# Patient Record
Sex: FEMALE | Race: WHITE | NOT HISPANIC OR LATINO | Employment: PART TIME | URBAN - METROPOLITAN AREA
[De-identification: names, ages, dates, MRNs, and addresses within clinical notes are randomized per-mention and may not be internally consistent; named-entity substitution may affect disease eponyms.]

---

## 2017-03-13 ENCOUNTER — ALLSCRIPTS OFFICE VISIT (OUTPATIENT)
Dept: OTHER | Facility: OTHER | Age: 57
End: 2017-03-13

## 2017-07-17 ENCOUNTER — ALLSCRIPTS OFFICE VISIT (OUTPATIENT)
Dept: OTHER | Facility: OTHER | Age: 57
End: 2017-07-17

## 2017-12-11 ENCOUNTER — ALLSCRIPTS OFFICE VISIT (OUTPATIENT)
Dept: OTHER | Facility: OTHER | Age: 57
End: 2017-12-11

## 2017-12-11 ENCOUNTER — TRANSCRIBE ORDERS (OUTPATIENT)
Dept: ADMINISTRATIVE | Facility: HOSPITAL | Age: 57
End: 2017-12-11

## 2017-12-11 DIAGNOSIS — G43.709 CHRONIC MIGRAINE WITHOUT AURA WITHOUT STATUS MIGRAINOSUS, NOT INTRACTABLE: Primary | ICD-10-CM

## 2017-12-11 DIAGNOSIS — G43.709 CHRONIC MIGRAINE WITHOUT AURA WITHOUT STATUS MIGRAINOSUS, NOT INTRACTABLE: ICD-10-CM

## 2017-12-11 DIAGNOSIS — E55.9 VITAMIN D DEFICIENCY: ICD-10-CM

## 2017-12-11 DIAGNOSIS — G47.00 INSOMNIA: ICD-10-CM

## 2017-12-12 NOTE — PROGRESS NOTES
Assessment    1  Chronic migraine without aura (346 70) (G43 709)   2  Low vitamin D level (268 9) (E55 9)    Plan  Chronic migraine without aura    · Cyproheptadine HCl - 4 MG Oral Tablet; take 1/2 tab po q hs x 5 days then 1 tabpo q hs thereafter   Rx By: Bj Frederick; Dispense: 0 Days ; #:30 Tablet; Refill: 0;For: Chronic migraine without aura; LEANNA = N; Verified Transmission to [facility]; Last Updated By: System, SureScripts; 12/11/2017 1:41:24 PM   · Topiramate 100 MG Oral Tablet; take 1 tablet twice a day   Rx By: Bj Frederick; Dispense: 30 Days ; #:60 Tablet; Refill: 3;For: Chronic migraine without aura; LEANNA = N; Verified Transmission to [facility]; Last Updated By: System, SureScripts; 12/11/2017 1:41:26 PM   · (1) HOMOCYSTEINE; Status:Active; Requested for:32Icq3263;    Perform:[facility]; Due:20Ilx5866; Ordered; For:Chronic migraine without aura; Ordered By:Anu Carrillo;   · (1) VITAMIN B12; Status:Active; Requested for:83Mrb9879;    Perform:[facility]; Due:50Opv9737; Ordered;migraine without aura; Ordered By:Anu Carrillo;   · * MRI BRAIN W WO CONTRAST; Status:Need Information - Financial Authorization; Requested for:66Xfh7666;    Perform:[facility]; Due:99Mcd2028; Ordered;migraine without aura; Ordered By:Anu Carrillo;   · * MRI CERVICAL SPINE WO CONTRAST; Status:Need Information - FinancialAuthorization; Requested for:64Top7890;    Perform:[facility]; Due:79Sfz6489; Ordered;migraine without aura; Ordered By:Anu Carrillo;   · Follow-up visit in 2 months Evaluation and Treatment  Follow-up  Status: Complete Done: 22KJE8810   Ordered; For: Chronic migraine without aura; Ordered By: Bj Frederick Performed:  Due: 25TFX9409; Last Updated By: Sarika White; 12/11/2017 2:08:52 PM  Chronic migraine without aura, Insomnia    · (1) TSH; Status:Active; Requested for:44Zzj0540;    Perform:[facility]; Due:50Daw7596; Ordered; For:Chronic migraine without aura, Insomnia; Ordered By:Anu Carrillo; Low vitamin D level    · (1) VITAMIN D 25-HYDROXY; Status:Active; Requested for:51Dkn6253;    Perform:[facility]; Due:55Bcl9968; Ordered;vitamin D level; Ordered By:Anu Carrillo;    Discussion/Summary  Discussion Summary:   Preventive:WIll increase her Topamax to 100 mg twice a day  she is to take cyproheptadine 4 mg half a tab for 5 days then one tab at bedtime for 2 months until seen to see if that helps decrease the morning headaches  she is to take Maxalt at onset and may repeat in two hours if needed  History of Present Illness  HPI: We had the pleasure of evaluating Jammie Floyd in neurological follow up today  As you know she is a 56year old right handed female  She is a  and is here today for evaluation of her headaches  She has history of hypertension, depression and headaches  Migraine headaches:medications do you take or have you taken for your headaches? Toprol, Zoloft, Prednisone (no headaches with steroids) she took it for a week, gabapentin, Topamax, botox (x 2 with no help), amitriptylineExcedrin (almost daily), ImitrexHeadache trigger: Fatigue, Stress/Tension, chocolate and turkeynone  often do the headaches occur - daily in the morning still but they are not as severe as they had been in the past time of the day do the headaches start â morning she wakes up with a 8-9/10 and as the day progress it does get better but at time it does not  She has daith earing in place  Two times a week it does not feel better  long do the headaches last â sometimes all day other time few hours a dayare they located â frontal, temporal and jaw painis the intensity of pain â 8-9/10 every few months, usually in the morning 6/10your usual headache - Throbbing, pressure, achy, shooting, stabbingsymptoms: photophobia, phonophobia, sensitivity to smell, nausea, vomiting, concentration problems, flushing of face, light-headed or dizzy, prefer to be alone and in a dark room, unable to work  ROS  Review of Systems  Neurological ROS:  Constitutional: no fever, no chills, no recent weight gain, no recent weight loss, no complaints of feeling tired, no changes in appetite  HEENT:  no sinus problems, not feeling congested, no blurred vision, no dryness of the eyes, no eye pain, no hearing loss, no tinnitus, no mouth sores, no sore throat, no hoarseness, no dysphagia, no masses, no bleeding  Cardiovascular:  no chest pain or pressure, no palpitations present, the heart rate was not rapid or irregular, no swelling in the arms or legs, no poor circulation  Respiratory:  no unusual or persistant cough, no shortness of breath with or without exertion  Gastrointestinal:  no nausea, no vomiting, no diarrhea, no abdominal pain, no changes in bowel habits, no melena, no loss of bowel control  Genitourinary:  no incontinence, no feelings of urinary urgency, no increase in frequency, no urinary hesitancy, no dysuria, no hematuria  Musculoskeletal:  no arthralgias, no myalgias, no immobility or loss of function, no head/neck/back pain, no pain while walking  Integumentary  no masses, no rash, no skin lesions, no livedo reticularis  Psychiatric:  no anxiety, no depression, no mood swings, no psychiatric hospitalizations, no sleep problems  Endocrine  no unusual weight loss or gain, no excessive urination, no excessive thirst, no hair loss or gain, no hot or cold intolerance, no menstrual period change or irregularity, no loss of sexual ability or drive, no erection difficulty, no nipple discharge  Hematologic/Lymphatic:  no unusual bleeding, no tendency for easy bruising, no clotting skin or lumps  Neurological General: headache, but-- no nausea or vomiting,-- no lightheadedness,-- no convulsions,-- no syncope-- and-- no trauma    Neurological Mental Status:  no confusion, no mood swings, no alteration or loss of consciousness, no difficulty expressing/understanding speech, no memory problems  Neurological Cranial Nerves:  no blurry or double vision, no loss of vision, no face drooping, no facial numbness or weakness, no taste or smell loss/changes, no hearing loss or ringing, no vertigo or dizziness, no dysphagia, no slurred speech  Neurological Motor findings include:  no tremor, no twitching, no cramping(pre/post exercise), no atrophy  Neurological Coordination:  no unsteadiness, no vertigo or dizziness, no clumsiness, no problems reaching for objects  ROS Reviewed:   ROS reviewed  Active Problems  1  Chronic migraine without aura (346 70) (G43 709)   2  Frequent episodic tension-type headache (339 11) (G44 219)   3  Insomnia (780 52) (G47 00)   4  Medication overuse headache (339 3) (G44 40)    Past Medical History  1  History of Anxiety (300 00) (F41 9)   2  History of depression (V11 8) (Z86 59)   3  History of hypertension (V12 59) (Z86 79)   4  History of migraine (V12 49) (Z86 69)    Surgical History  1  History of  Section    Family History  Mother    1  Family history of hypertension (V17 49) (Z82 49)  Family History    2  Family history of bipolar disorder (V17 0) (Z81 8)   3  Family history of hypertension (V17 49) (Z82 49)    Social History     · Completed 11th grade   · Current every day smoker (305 1) (F17 200)   · Four children   · Lives with significant other   · No alcohol use   · No drug use   · Single    Current Meds   1  ClonazePAM 0 5 MG Oral Tablet; TAKE 1 TABLET DAILY; Therapy: (Recorded:76Tzc0018) to Recorded   2  Magnesium Oxide 250 MG Oral Tablet; TAKE ONE IN AM DAILY; Therapy: 74NPZ7892 to (Last Rx:2016)  Requested for: 2016 Ordered   3  RisperDAL 1 MG Oral Tablet; take 0 5 tablet daily; Therapy: (Recorded:2016) to Recorded   4  Rizatriptan Benzoate 10 MG Oral Tablet Disintegrating; DISSOLVE 1 TABLET AT ONSET OF MIGRAINE MAY REPAT 2 HOURS LATER (MAX OF 2 IN 24 HOURS, 3 TABS/WEEK);  Therapy: 81ZIO2777 to (Evaluate:15Oct2017)  Requested for: 56RSE5546; Last Rx:99Kmi1938 Ordered   5  Topiramate 100 MG Oral Tablet; 1 5 mg qHS; Therapy: 35KOC4022 to (Evaluate:13Jan2018)  Requested for: 66EJR3092; Last Rx:42Tdm7166 Ordered   6  Topiramate 100 MG Oral Tablet; 1 5 tabs qHS (150 mg); [facility] 93ZVX4868 to ([facility]  Requested for: 08MMB5119; Last Rx:05Xth5961 Ordered   7  Toprol XL 50 MG Oral Tablet Extended Release 24 Hour; Take 1 tablet daily; Therapy: (Recorded:31Mar2015) to Recorded   8  Vitamin B-12 1000 MCG Oral Tablet; Take 1000mcg daily; Therapy: 97OKI4044 to (Last Rx:29Jun2016) Ordered   9  Vitamin B-2 100 MG Oral Tablet; Take 200mg daily; Therapy: 02DFU2962 to (Evaluate:27Oct2016); Last Rx:29Jun2016 Ordered   10  Zoloft 100 MG Oral Tablet; take 2 tablet daily; Therapy: (Recorded:31Mar2015) to Recorded    Allergies    1  No Known Drug Allergies    Physical Exam   Constitutional  General appearance: No acute distress, well appearing and well nourished  -- she has mild cervical dystonia with neck tilted to the right  Eyes  Ophthalmoscopic examination: Vision is grossly normal  Gross visual field testing by confrontation shows no abnormalities  EOMI in both eyes  Conjunctivae clear  Eyelids normal palpebral fissures equal  Orbits exhibit normal position  No discharge from the eyes  PERRL  Musculoskeletal  Gait and station: Normal gait, stance and balance  Muscle strength: Normal strength throughout  Muscle tone: No atrophy, abnormal movements, flaccidity, cogwheeling or spasticity     Neurologic  Orientation to person, place, and time: Normal    2nd cranial nerve: Normal    3rd, 4th, and 6th cranial nerves: Normal    5th cranial nerve: Normal    7th cranial nerve: Normal    8th cranial nerve: Normal    9th cranial nerve: Normal    11th cranial nerve: Normal    12th cranial nerve: Normal    Sensation: Normal    Reflexes: Abnormal   Deep tendon reflexes: 3+ right patella-- and-- 3+ left patella2+ right biceps,-- 2+ left biceps,-- 2+ right triceps,-- 2+ left triceps,-- 2+ right brachioradialis,-- 2+ left brachioradialis,-- 2+ right ankle jerk-- and-- 2+ left ankle jerk    Coordination: Normal    Mood and affect: Normal        Future Appointments    Date/Time Provider Specialty Site   02/13/2018 11:30 AM Margarette Devlin [facility] [address]       Signatures   Electronically signed by : Carol Santos MD; Dec 11 2017  2:17PM EST                       (Author)

## 2017-12-20 ENCOUNTER — APPOINTMENT (OUTPATIENT)
Dept: LAB | Facility: HOSPITAL | Age: 57
End: 2017-12-20
Attending: PSYCHIATRY & NEUROLOGY
Payer: COMMERCIAL

## 2017-12-20 DIAGNOSIS — E55.9 VITAMIN D DEFICIENCY: ICD-10-CM

## 2017-12-20 DIAGNOSIS — G43.709 CHRONIC MIGRAINE WITHOUT AURA WITHOUT STATUS MIGRAINOSUS, NOT INTRACTABLE: ICD-10-CM

## 2017-12-20 DIAGNOSIS — G47.00 INSOMNIA: ICD-10-CM

## 2017-12-20 LAB
25(OH)D3 SERPL-MCNC: 30.3 NG/ML (ref 30–100)
BUN SERPL-MCNC: 16 MG/DL (ref 5–25)
CREAT SERPL-MCNC: 0.82 MG/DL (ref 0.6–1.3)
GFR SERPL CREATININE-BSD FRML MDRD: 81 ML/MIN/1.73SQ M
HCYS SERPL-SCNC: 11 UMOL/L (ref 3.7–11.2)
TSH SERPL DL<=0.05 MIU/L-ACNC: 0.85 UIU/ML (ref 0.36–3.74)
VIT B12 SERPL-MCNC: 643 PG/ML (ref 100–900)

## 2017-12-20 PROCEDURE — 36415 COLL VENOUS BLD VENIPUNCTURE: CPT

## 2017-12-20 PROCEDURE — 82565 ASSAY OF CREATININE: CPT

## 2017-12-20 PROCEDURE — 83090 ASSAY OF HOMOCYSTEINE: CPT

## 2017-12-20 PROCEDURE — 82607 VITAMIN B-12: CPT

## 2017-12-20 PROCEDURE — 84520 ASSAY OF UREA NITROGEN: CPT

## 2017-12-20 PROCEDURE — 84443 ASSAY THYROID STIM HORMONE: CPT

## 2017-12-20 PROCEDURE — 82306 VITAMIN D 25 HYDROXY: CPT

## 2017-12-27 ENCOUNTER — HOSPITAL ENCOUNTER (OUTPATIENT)
Dept: MRI IMAGING | Facility: HOSPITAL | Age: 57
Discharge: HOME | End: 2017-12-30
Payer: COMMERCIAL

## 2017-12-27 DIAGNOSIS — G43.709 CHRONIC MIGRAINE WITHOUT AURA WITHOUT STATUS MIGRAINOSUS, NOT INTRACTABLE: ICD-10-CM

## 2017-12-27 PROCEDURE — 72141 MRI NECK SPINE W/O DYE: CPT

## 2017-12-27 PROCEDURE — A9585 GADOBUTROL INJECTION: HCPCS | Performed by: RADIOLOGY

## 2017-12-27 PROCEDURE — 70553 MRI BRAIN STEM W/O & W/DYE: CPT

## 2017-12-27 RX ADMIN — GADOBUTROL 6 ML: 604.72 INJECTION INTRAVENOUS at 15:44

## 2018-01-13 VITALS
OXYGEN SATURATION: 98 % | DIASTOLIC BLOOD PRESSURE: 70 MMHG | RESPIRATION RATE: 16 BRPM | SYSTOLIC BLOOD PRESSURE: 120 MMHG | HEART RATE: 69 BPM | WEIGHT: 134.44 LBS

## 2018-01-13 VITALS
WEIGHT: 132.5 LBS | OXYGEN SATURATION: 95 % | BODY MASS INDEX: 22.74 KG/M2 | SYSTOLIC BLOOD PRESSURE: 130 MMHG | HEART RATE: 67 BPM | DIASTOLIC BLOOD PRESSURE: 86 MMHG

## 2018-02-12 RX ORDER — RISPERIDONE 1 MG/1
0.5 TABLET, FILM COATED ORAL DAILY
COMMUNITY
End: 2018-08-30 | Stop reason: ALTCHOICE

## 2018-02-12 RX ORDER — CYPROHEPTADINE HYDROCHLORIDE 4 MG/1
4 TABLET ORAL DAILY
Refills: 0 | COMMUNITY
Start: 2018-01-22 | End: 2018-12-31 | Stop reason: SDUPTHER

## 2018-02-12 RX ORDER — RIZATRIPTAN BENZOATE 10 MG/1
1 TABLET, ORALLY DISINTEGRATING ORAL
COMMUNITY
Start: 2017-07-17 | End: 2018-08-16 | Stop reason: SDUPTHER

## 2018-02-12 RX ORDER — METOPROLOL SUCCINATE 50 MG/1
1 TABLET, EXTENDED RELEASE ORAL DAILY
COMMUNITY
End: 2018-08-30 | Stop reason: ALTCHOICE

## 2018-02-12 RX ORDER — CLONAZEPAM 0.5 MG/1
0.5 TABLET ORAL DAILY
Refills: 0 | COMMUNITY
Start: 2018-01-25

## 2018-02-12 RX ORDER — TOPIRAMATE 100 MG/1
200 TABLET, FILM COATED ORAL DAILY
COMMUNITY
Start: 2015-03-31 | End: 2018-08-30 | Stop reason: ALTCHOICE

## 2018-02-12 RX ORDER — AMITRIPTYLINE HYDROCHLORIDE 10 MG/1
TABLET, FILM COATED ORAL
COMMUNITY
Start: 2016-12-13 | End: 2018-02-13

## 2018-02-12 RX ORDER — MULTIVITAMIN/IRON/FOLIC ACID 18MG-0.4MG
1 TABLET ORAL DAILY
COMMUNITY
Start: 2016-03-28

## 2018-02-12 RX ORDER — SERTRALINE HYDROCHLORIDE 100 MG/1
2 TABLET, FILM COATED ORAL DAILY
COMMUNITY
End: 2020-04-21

## 2018-02-12 RX ORDER — LANOLIN ALCOHOL/MO/W.PET/CERES
1000 CREAM (GRAM) TOPICAL DAILY
COMMUNITY
Start: 2016-06-29

## 2018-02-12 NOTE — PROGRESS NOTES
Patient ID: Harish Ayala is a 56 y o  female  Assessment/Plan:    Intractable chronic migraine without aura and without status migrainosus  Continue Topamax 100 mg twice a day  Continue vitamin B2  Continue Zoloft  Continue magnesium  Continue metoprolol  Continue to use rizatriptan as needed  We need to decrease Excedrin use as daily use causes medication overuse headaches  Therefore, we will setup  headache infusion for 5 days    Cellulitis  Augmentin twice a day for 10 days  Please take pro biotic (ami-stor)  Follow-up with your primary care doctor for this         Problem List Items Addressed This Visit        Cardiovascular and Mediastinum    Intractable chronic migraine without aura and without status migrainosus - Primary     Continue Topamax 100 mg twice a day  Continue vitamin B2  Continue Zoloft  Continue magnesium  Continue metoprolol  Continue to use rizatriptan as needed  We need to decrease Excedrin use as daily use causes medication overuse headaches  Therefore, we will setup  headache infusion for 5 days         Relevant Medications    clonazePAM (KlonoPIN) 0 5 mg tablet    rizatriptan (MAXALT-MLT) 10 MG disintegrating tablet    topiramate (TOPAMAX) 100 mg tablet    metoprolol succinate (TOPROL XL) 50 mg 24 hr tablet    sertraline (ZOLOFT) 100 mg tablet       Other    Cellulitis     Augmentin twice a day for 10 days  Please take pro biotic (ami-stor)  Follow-up with your primary care doctor for this         Relevant Medications    amoxicillin-clavulanate (AUGMENTIN) 875-125 mg per tablet         Follow-up in 6 weeks  This should be after headache infusion  Subjective:    HPI    We had the pleasure of evaluating Harish Ayala in neurological follow up today  As you know she is a 56year old right handed female  She is a  and is here today for evaluation of her headaches  She has history of hypertension, depression and headaches   At last visit Topamax was increased and cyproheptadine was added  This provided no help  Patient still complains of headaches which start when she 1st wakes up at a 6/10 and go all the way to a 10/10 a few times a month  She has no relief  She takes her Excedrin migraine every day without fail  In addition, patient needed to remove her Daith earring as it was infected  Patient was not placed on any antibiotics  Migraine headaches:  What medications do you take or have you taken for your headaches? Preventative:Toprol, Zoloft, Prednisone (no headaches with steroids) she took it for a week, gabapentin, Topamax, botox (x 2 with no help), amitriptyline, cyproheptadine  Abortive: Excedrin migraine, imitrex, maxalt  Headache trigger: Fatigue, Stress/Tension, chocolate and turkey  How often do the headaches occur - daily in the morning still but they are not as severe as they had been in the past   What time of the day do the headaches star morning she wakes up with a 8-9/10 and as the day progress it does get better but at time it does not  She has daith earing in place  Two times a week it does not feel better  How long do the headaches last: sometimes all day other time few hours a day  Where are they located: frontal, temporal and jaw pain  What is the intensity of pain: 8-10/10 a few times a months, usually in the morning 6/10  Describe your usual headache - Throbbing, pressure, achy, shooting, stabbing  Associated symptoms: photophobia, phonophobia, sensitivity to smell, nausea, vomiting, concentration problems, flushing of face, light-headed or dizzy, prefer to be alone and in a dark room, unable to work    Vit D, B12, homocysteine, TSH normal 12/17 12/27/17 MRI C spine    IMPRESSION:     Mild spondylotic changes are similar to the previous study  12/27/17 MRI Brain  IMPRESSION:     No acute infarction, intracranial hemorrhage or mass           The following portions of the patient's history were reviewed and updated as appropriate: allergies, current medications, past family history, past medical history, past social history, past surgical history and problem list          Objective: There were no vitals taken for this visit  Physical Exam   Constitutional: She appears well-developed and well-nourished  HENT:   Head: Normocephalic and atraumatic  Left Ear: There is drainage, swelling and tenderness  Left external ear very edematous, erythematous and appears infected warm to the touch   Eyes: EOM are normal  Pupils are equal, round, and reactive to light  Neck: Normal range of motion  Cardiovascular: Normal rate  Pulmonary/Chest: Effort normal    Musculoskeletal: Normal range of motion  Neurological: She has normal strength and normal reflexes  Gait and coordination normal    Skin: Skin is warm and dry  Psychiatric: She has a normal mood and affect  Her speech is normal    Nursing note and vitals reviewed  Neurological Exam    Mental Status  The patient is alert and oriented to person, place, time, and situation  Her recent and remote memory are normal  She has no visuospatial neglect  Her speech is normal  Her language is fluent with no aphasia  She has normal attention span and concentration  She has a normal fund of knowledge  Cranial Nerves    CN II: The patient's visual acuity and visual fields are normal   CN III, IV, VI: The patient's pupils are equally round and reactive to light and ocular movements are normal   CN V: The patient has normal facial sensation  CN VII:  The patient has symmetric facial movement  CN VIII:  The patient's hearing is normal   CN IX, X: The patient has symmetric palate movement and normal gag reflex  CN XI: The patient's shoulder shrug strength is normal   CN XII: The patient's tongue is midline without atrophy or fasciculations  Motor  The patient has normal muscle bulk throughout  Her overall muscle tone is normal throughout   Her strength is 5/5 throughout all four extremities  Sensory  The patient's sensation is normal in all four extremities  She has normal cortical sensation    Reflexes  Deep tendon reflexes are 2+ and symmetric in all four extremities with downgoing toes bilaterally  Gait and Coordination  The patient has normal gait and station and normal casual, toe, heel, and tandem gait  She has normal coordination bilaterally  ROS:    Review of Systems   Constitutional: Positive for fatigue  HENT: Positive for ear discharge and ear pain  Eyes: Negative  Respiratory: Negative  Cardiovascular: Negative  Gastrointestinal: Negative  Endocrine: Negative  Genitourinary: Negative  Musculoskeletal: Negative  Skin: Negative  Allergic/Immunologic: Negative  Neurological: Positive for weakness, numbness and headaches  Hematological: Negative  Psychiatric/Behavioral: The patient is nervous/anxious

## 2018-02-13 ENCOUNTER — OFFICE VISIT (OUTPATIENT)
Dept: NEUROLOGY | Facility: CLINIC | Age: 58
End: 2018-02-13
Payer: COMMERCIAL

## 2018-02-13 DIAGNOSIS — G43.719 INTRACTABLE CHRONIC MIGRAINE WITHOUT AURA AND WITHOUT STATUS MIGRAINOSUS: Primary | ICD-10-CM

## 2018-02-13 DIAGNOSIS — L03.818 CELLULITIS OF OTHER SPECIFIED SITE: ICD-10-CM

## 2018-02-13 PROBLEM — L03.90 CELLULITIS: Status: ACTIVE | Noted: 2018-02-13

## 2018-02-13 PROCEDURE — 99214 OFFICE O/P EST MOD 30 MIN: CPT | Performed by: PHYSICIAN ASSISTANT

## 2018-02-13 RX ORDER — AMOXICILLIN AND CLAVULANATE POTASSIUM 875; 125 MG/1; MG/1
1 TABLET, FILM COATED ORAL EVERY 12 HOURS SCHEDULED
Qty: 20 TABLET | Refills: 0 | Status: SHIPPED | OUTPATIENT
Start: 2018-02-13 | End: 2018-02-23

## 2018-02-13 NOTE — PROGRESS NOTES
Patient ID: June Jammie is a 56 y o  female  Assessment/Plan:    No problem-specific Assessment & Plan notes found for this encounter  {Assess/PlanSmartLinks:54221}       Subjective:    HPI    {[facility] HPI texts:35986}    {Common ambulatory SmartLinks:72381}         Objective: There were no vitals taken for this visit  Physical Exam    Neurological Exam      ROS:    Review of Systems   Constitutional: Positive for fatigue  HENT: Negative  Eyes: Negative  Respiratory: Negative  Cardiovascular: Negative  Gastrointestinal: Negative  Endocrine: Negative  Genitourinary: Negative  Musculoskeletal: Negative  Skin: Negative  Allergic/Immunologic: Negative  Neurological: Positive for weakness, numbness and headaches  Hematological: Negative  Psychiatric/Behavioral: The patient is nervous/anxious

## 2018-02-13 NOTE — PATIENT INSTRUCTIONS
We will schedule headache infusion for you at the first opening, we will call when this is set up  Talk to your PCP about the thyroid nodule seen on your MRI from December      We will make medication adjustments after your headache infusion  We will plan to see you a week or so after the infusion

## 2018-02-13 NOTE — ASSESSMENT & PLAN NOTE
Continue Topamax 100 mg twice a day  Continue vitamin B2  Continue Zoloft  Continue magnesium  Continue metoprolol  Continue to use rizatriptan as needed  We need to decrease Excedrin use as daily use causes medication overuse headaches    Therefore, we will setup  headache infusion for 5 days

## 2018-02-13 NOTE — ASSESSMENT & PLAN NOTE
Augmentin twice a day for 10 days  Please take pro biotic (ami-stor)  Follow-up with your primary care doctor for this

## 2018-03-09 RX ORDER — SODIUM CHLORIDE 9 MG/ML
20 INJECTION, SOLUTION INTRAVENOUS CONTINUOUS
Status: DISCONTINUED | OUTPATIENT
Start: 2018-03-12 | End: 2018-03-15 | Stop reason: HOSPADM

## 2018-03-09 RX ORDER — DIPHENHYDRAMINE HCL 25 MG
50 TABLET ORAL ONCE
Status: COMPLETED | OUTPATIENT
Start: 2018-03-12 | End: 2018-03-12

## 2018-03-09 RX ORDER — METHOCARBAMOL 500 MG/1
1000 TABLET, FILM COATED ORAL ONCE
Status: COMPLETED | OUTPATIENT
Start: 2018-03-12 | End: 2018-03-12

## 2018-03-12 ENCOUNTER — HOSPITAL ENCOUNTER (OUTPATIENT)
Dept: INFUSION CENTER | Facility: CLINIC | Age: 58
Discharge: HOME | End: 2018-03-12
Payer: COMMERCIAL

## 2018-03-12 VITALS
TEMPERATURE: 98 F | RESPIRATION RATE: 18 BRPM | HEART RATE: 60 BPM | DIASTOLIC BLOOD PRESSURE: 97 MMHG | SYSTOLIC BLOOD PRESSURE: 150 MMHG

## 2018-03-12 PROCEDURE — 96366 THER/PROPH/DIAG IV INF ADDON: CPT

## 2018-03-12 PROCEDURE — 96375 TX/PRO/DX INJ NEW DRUG ADDON: CPT

## 2018-03-12 PROCEDURE — 96367 TX/PROPH/DG ADDL SEQ IV INF: CPT

## 2018-03-12 PROCEDURE — 96365 THER/PROPH/DIAG IV INF INIT: CPT

## 2018-03-12 RX ADMIN — DIHYDROERGOTAMINE MESYLATE: 1 INJECTION, SOLUTION INTRAMUSCULAR; INTRAVENOUS; SUBCUTANEOUS at 10:57

## 2018-03-12 RX ADMIN — METHOCARBAMOL 1000 MG: 500 TABLET ORAL at 08:58

## 2018-03-12 RX ADMIN — SODIUM CHLORIDE 20 ML/HR: 0.9 INJECTION, SOLUTION INTRAVENOUS at 09:10

## 2018-03-12 RX ADMIN — DIPHENHYDRAMINE HCL 50 MG: 25 TABLET ORAL at 08:57

## 2018-03-12 RX ADMIN — ONDANSETRON 4 MG: 2 INJECTION INTRAMUSCULAR; INTRAVENOUS at 10:38

## 2018-03-12 RX ADMIN — FAMOTIDINE 20 MG: 10 INJECTION, SOLUTION INTRAVENOUS at 09:38

## 2018-03-12 RX ADMIN — VALPROATE SODIUM 1000 MG: 100 INJECTION, SOLUTION INTRAVENOUS at 11:43

## 2018-03-12 RX ADMIN — MAGNESIUM SULFATE HEPTAHYDRATE: 500 INJECTION, SOLUTION INTRAMUSCULAR; INTRAVENOUS at 12:22

## 2018-03-12 RX ADMIN — SODIUM CHLORIDE 1000 MG: 9 INJECTION, SOLUTION INTRAVENOUS at 09:56

## 2018-03-12 NOTE — PROGRESS NOTES
Pt  Denies headache this morning, but states she woke with headache this AM   Pt  Denies new symptoms or concerns today  [facility] Infusion Questionaire completed and signed  Pt  Has a hx of HTN and used a lot of Excedrine Headache medication recently  Benadryl, Robaxin, Pepcid, Solumedrol, Zofran, DHE, Depacon and Magnesium Sulfate cocktail ordered for infusion today  Medication education provided both verbally and written

## 2018-03-12 NOTE — PLAN OF CARE
Problem: PAIN - ADULT  Goal: Verbalizes/displays adequate comfort level or baseline comfort level  Interventions:  - Encourage patient to monitor pain and request assistance  - Assess pain using appropriate pain scale  - Administer analgesics based on type and severity of pain and evaluate response  - Implement non-pharmacological measures as appropriate and evaluate response  - Consider cultural and social influences on pain and pain management  - Notify physician/advanced practitioner if interventions unsuccessful or patient reports new pain  Outcome: Progressing      Problem: INFECTION - ADULT  Goal: Absence or prevention of progression during hospitalization  INTERVENTIONS:  - Assess and monitor for signs and symptoms of infection  - Monitor lab/diagnostic results  - Monitor all insertion sites, i e  indwelling lines, tubes, and drains  - Monitor endotracheal (as able) and nasal secretions for changes in amount and color  - [place] appropriate cooling/warming therapies per order  - Administer medications as ordered  - Instruct and encourage patient and family to use good hand hygiene technique  - Identify and instruct in appropriate isolation precautions for identified infection/condition  Outcome: Progressing    Goal: Absence of fever/infection during neutropenic period  INTERVENTIONS:  - Monitor WBC  - Implement neutropenic guidelines  Outcome: Progressing      Problem: Knowledge Deficit  Goal: Patient/family/caregiver demonstrates understanding of disease process, treatment plan, medications, and discharge instructions  Complete learning assessment and assess knowledge base    Interventions:  - Provide teaching at level of understanding  - Provide teaching via preferred learning methods  Outcome: Progressing

## 2018-03-12 NOTE — PROGRESS NOTES
Pt  Tolerated Headache medication without adverse event  Future appointments reviewed  AVS reviewed and provided

## 2018-03-13 ENCOUNTER — HOSPITAL ENCOUNTER (OUTPATIENT)
Dept: INFUSION CENTER | Facility: CLINIC | Age: 58
Discharge: HOME | End: 2018-03-13
Payer: COMMERCIAL

## 2018-03-13 VITALS
HEART RATE: 75 BPM | DIASTOLIC BLOOD PRESSURE: 64 MMHG | RESPIRATION RATE: 16 BRPM | SYSTOLIC BLOOD PRESSURE: 110 MMHG | TEMPERATURE: 97.5 F

## 2018-03-13 PROCEDURE — 96375 TX/PRO/DX INJ NEW DRUG ADDON: CPT

## 2018-03-13 PROCEDURE — 96367 TX/PROPH/DG ADDL SEQ IV INF: CPT

## 2018-03-13 PROCEDURE — 96366 THER/PROPH/DIAG IV INF ADDON: CPT

## 2018-03-13 PROCEDURE — 96365 THER/PROPH/DIAG IV INF INIT: CPT

## 2018-03-13 RX ORDER — DIPHENHYDRAMINE HCL 25 MG
50 TABLET ORAL ONCE
Status: COMPLETED | OUTPATIENT
Start: 2018-03-13 | End: 2018-03-13

## 2018-03-13 RX ORDER — METHOCARBAMOL 500 MG/1
1000 TABLET, FILM COATED ORAL ONCE
Status: COMPLETED | OUTPATIENT
Start: 2018-03-13 | End: 2018-03-13

## 2018-03-13 RX ORDER — SODIUM CHLORIDE 9 MG/ML
20 INJECTION, SOLUTION INTRAVENOUS CONTINUOUS
Status: DISCONTINUED | OUTPATIENT
Start: 2018-03-13 | End: 2018-03-16 | Stop reason: HOSPADM

## 2018-03-13 RX ADMIN — METHOCARBAMOL 1000 MG: 500 TABLET ORAL at 08:39

## 2018-03-13 RX ADMIN — MAGNESIUM SULFATE HEPTAHYDRATE: 500 INJECTION, SOLUTION INTRAMUSCULAR; INTRAVENOUS at 12:18

## 2018-03-13 RX ADMIN — SODIUM CHLORIDE 1000 MG: 9 INJECTION, SOLUTION INTRAVENOUS at 09:17

## 2018-03-13 RX ADMIN — DIPHENHYDRAMINE HCL 50 MG: 25 TABLET ORAL at 08:39

## 2018-03-13 RX ADMIN — ONDANSETRON 4 MG: 2 INJECTION INTRAMUSCULAR; INTRAVENOUS at 10:05

## 2018-03-13 RX ADMIN — SODIUM CHLORIDE 20 ML/HR: 0.9 INJECTION, SOLUTION INTRAVENOUS at 08:36

## 2018-03-13 RX ADMIN — DIHYDROERGOTAMINE MESYLATE: 1 INJECTION, SOLUTION INTRAMUSCULAR; INTRAVENOUS; SUBCUTANEOUS at 10:47

## 2018-03-13 RX ADMIN — VALPROATE SODIUM 1000 MG: 100 INJECTION, SOLUTION INTRAVENOUS at 11:42

## 2018-03-13 RX ADMIN — FAMOTIDINE 20 MG: 10 INJECTION, SOLUTION INTRAVENOUS at 08:43

## 2018-03-13 NOTE — PROGRESS NOTES
Patient arrived on unit  Stated she did not sleep very well last evening  Headache at a "3" this am  Denies any nausea/vomiting/dizziness  Denies taking any pain medicine last night or this am  Headache infusion initiated

## 2018-03-13 NOTE — PLAN OF CARE
Problem: Potential for Falls  Goal: Patient will remain free of falls  INTERVENTIONS:  - Assess patient frequently for physical needs  -  Identify cognitive and physical deficits and behaviors that affect risk of falls    -  [place] fall precautions as indicated by assessment   - Educate patient/family on patient safety including physical limitations  - Instruct patient to call for assistance with activity based on assessment  - Modify environment to reduce risk of injury  - Consider OT/PT consult to assist with strengthening/mobility   Outcome: Progressing

## 2018-03-13 NOTE — PROGRESS NOTES
Patient tolerated headache infusion today  Patient denies any headache at completion of infusion today  Denies any nausea/vomiting/dizziness  Aware to return tomorrow for Day #3

## 2018-03-14 ENCOUNTER — HOSPITAL ENCOUNTER (OUTPATIENT)
Dept: INFUSION CENTER | Facility: CLINIC | Age: 58
Discharge: HOME | End: 2018-03-14
Payer: COMMERCIAL

## 2018-03-14 VITALS
HEART RATE: 76 BPM | DIASTOLIC BLOOD PRESSURE: 67 MMHG | TEMPERATURE: 97.8 F | SYSTOLIC BLOOD PRESSURE: 97 MMHG | RESPIRATION RATE: 18 BRPM

## 2018-03-14 DIAGNOSIS — F51.01 PRIMARY INSOMNIA: Primary | ICD-10-CM

## 2018-03-14 PROCEDURE — 96366 THER/PROPH/DIAG IV INF ADDON: CPT

## 2018-03-14 PROCEDURE — 96367 TX/PROPH/DG ADDL SEQ IV INF: CPT

## 2018-03-14 PROCEDURE — 96365 THER/PROPH/DIAG IV INF INIT: CPT

## 2018-03-14 RX ORDER — DIPHENHYDRAMINE HCL 25 MG
50 TABLET ORAL ONCE
Status: COMPLETED | OUTPATIENT
Start: 2018-03-15 | End: 2018-03-15

## 2018-03-14 RX ORDER — DIPHENHYDRAMINE HCL 25 MG
50 TABLET ORAL ONCE
Status: COMPLETED | OUTPATIENT
Start: 2018-03-14 | End: 2018-03-14

## 2018-03-14 RX ORDER — METHOCARBAMOL 500 MG/1
1000 TABLET, FILM COATED ORAL ONCE
Status: COMPLETED | OUTPATIENT
Start: 2018-03-14 | End: 2018-03-14

## 2018-03-14 RX ORDER — TRAZODONE HYDROCHLORIDE 50 MG/1
50 TABLET ORAL
Qty: 5 TABLET | Refills: 0 | OUTPATIENT
Start: 2018-03-14 | End: 2018-08-30 | Stop reason: ALTCHOICE

## 2018-03-14 RX ORDER — METHOCARBAMOL 500 MG/1
1000 TABLET, FILM COATED ORAL ONCE
Status: COMPLETED | OUTPATIENT
Start: 2018-03-15 | End: 2018-03-15

## 2018-03-14 RX ORDER — SODIUM CHLORIDE 9 MG/ML
20 INJECTION, SOLUTION INTRAVENOUS CONTINUOUS
Status: DISCONTINUED | OUTPATIENT
Start: 2018-03-15 | End: 2018-03-18 | Stop reason: HOSPADM

## 2018-03-14 RX ORDER — SODIUM CHLORIDE 9 MG/ML
20 INJECTION, SOLUTION INTRAVENOUS CONTINUOUS
Status: DISCONTINUED | OUTPATIENT
Start: 2018-03-14 | End: 2018-03-17 | Stop reason: HOSPADM

## 2018-03-14 RX ADMIN — MAGNESIUM SULFATE HEPTAHYDRATE: 500 INJECTION, SOLUTION INTRAMUSCULAR; INTRAVENOUS at 11:30

## 2018-03-14 RX ADMIN — METHOCARBAMOL 1000 MG: 500 TABLET ORAL at 08:17

## 2018-03-14 RX ADMIN — DIPHENHYDRAMINE HCL 50 MG: 25 TABLET ORAL at 08:17

## 2018-03-14 RX ADMIN — SODIUM CHLORIDE 500 MG: 9 INJECTION, SOLUTION INTRAVENOUS at 09:25

## 2018-03-14 RX ADMIN — FAMOTIDINE 20 MG: 10 INJECTION, SOLUTION INTRAVENOUS at 08:29

## 2018-03-14 RX ADMIN — SODIUM CHLORIDE 20 ML/HR: 0.9 INJECTION, SOLUTION INTRAVENOUS at 08:29

## 2018-03-14 RX ADMIN — VALPROATE SODIUM 1000 MG: 100 INJECTION, SOLUTION INTRAVENOUS at 10:59

## 2018-03-14 RX ADMIN — DIHYDROERGOTAMINE MESYLATE: 1 INJECTION, SOLUTION INTRAMUSCULAR; INTRAVENOUS; SUBCUTANEOUS at 10:09

## 2018-03-14 RX ADMIN — ONDANSETRON 4 MG: 2 INJECTION INTRAMUSCULAR; INTRAVENOUS at 08:49

## 2018-03-14 NOTE — PLAN OF CARE
Problem: Potential for Falls  Goal: Patient will remain free of falls  INTERVENTIONS:  - Assess patient frequently for physical needs  -  Identify cognitive and physical deficits and behaviors that affect risk of falls    -  [place] fall precautions as indicated by assessment   - Educate patient/family on patient safety including physical limitations  - Instruct patient to call for assistance with activity based on assessment  - Modify environment to reduce risk of injury  - Consider OT/PT consult to assist with strengthening/mobility   Outcome: Progressing

## 2018-03-14 NOTE — PROGRESS NOTES
Pt arrived to unit in stable condition but does complain of insomnia  Pt states she did not sleep during headache infusion at all yesterday and she did not sleep at all overnight  Headache pain is rated at 3-4  Pt admits to mild nausea as well  It is noted that pt's Solumedrol dose has been decreased by 50% today  Dr Lexy Isaac notified of insomnia and she states she will call in prescription for sleeping med for pt tonight  Pt informed of same and verbalized understanding  Zofran will be administered for nausea as ordered

## 2018-03-14 NOTE — PROGRESS NOTES
Pt tolerated headache infusion well  Pt's nausea was relieved by Zofran, pt denied headache upon discharge, pt was able to sleep for brief periods today, she will  prescription for sleep aid on her way home today  Pt left unit in stable condition without question or concern

## 2018-03-15 ENCOUNTER — HOSPITAL ENCOUNTER (OUTPATIENT)
Dept: INFUSION CENTER | Facility: CLINIC | Age: 58
Discharge: HOME | End: 2018-03-15
Payer: COMMERCIAL

## 2018-03-15 VITALS
HEART RATE: 70 BPM | TEMPERATURE: 97.7 F | RESPIRATION RATE: 18 BRPM | DIASTOLIC BLOOD PRESSURE: 78 MMHG | SYSTOLIC BLOOD PRESSURE: 137 MMHG

## 2018-03-15 PROCEDURE — 96366 THER/PROPH/DIAG IV INF ADDON: CPT

## 2018-03-15 PROCEDURE — 96367 TX/PROPH/DG ADDL SEQ IV INF: CPT

## 2018-03-15 PROCEDURE — 96365 THER/PROPH/DIAG IV INF INIT: CPT

## 2018-03-15 RX ADMIN — MAGNESIUM SULFATE HEPTAHYDRATE: 500 INJECTION, SOLUTION INTRAMUSCULAR; INTRAVENOUS at 11:52

## 2018-03-15 RX ADMIN — VALPROATE SODIUM 500 MG: 100 INJECTION, SOLUTION INTRAVENOUS at 11:27

## 2018-03-15 RX ADMIN — ONDANSETRON 4 MG: 2 INJECTION INTRAMUSCULAR; INTRAVENOUS at 09:19

## 2018-03-15 RX ADMIN — SODIUM CHLORIDE 250 MG: 9 INJECTION, SOLUTION INTRAVENOUS at 09:41

## 2018-03-15 RX ADMIN — DIPHENHYDRAMINE HCL 50 MG: 25 TABLET ORAL at 08:55

## 2018-03-15 RX ADMIN — SODIUM CHLORIDE 20 ML/HR: 0.9 INJECTION, SOLUTION INTRAVENOUS at 08:55

## 2018-03-15 RX ADMIN — FAMOTIDINE 20 MG: 10 INJECTION, SOLUTION INTRAVENOUS at 08:57

## 2018-03-15 RX ADMIN — METHOCARBAMOL 1000 MG: 500 TABLET ORAL at 08:55

## 2018-03-15 RX ADMIN — DIHYDROERGOTAMINE MESYLATE: 1 INJECTION, SOLUTION INTRAMUSCULAR; INTRAVENOUS; SUBCUTANEOUS at 10:21

## 2018-03-15 NOTE — PROGRESS NOTES
Patient tolerated her headache infusion well without adverse reaction  She left ambulatory rating her headache at 0/10

## 2018-03-15 NOTE — PLAN OF CARE
Problem: Potential for Falls  Goal: Patient will remain free of falls  INTERVENTIONS:  - Assess patient frequently for physical needs  -  Identify cognitive and physical deficits and behaviors that affect risk of falls    -  [place] fall precautions as indicated by assessment   - Educate patient/family on patient safety including physical limitations  - Instruct patient to call for assistance with activity based on assessment  - Modify environment to reduce risk of injury  - Consider OT/PT consult to assist with strengthening/mobility   Outcome: Progressing

## 2018-03-16 ENCOUNTER — HOSPITAL ENCOUNTER (OUTPATIENT)
Dept: INFUSION CENTER | Facility: CLINIC | Age: 58
Discharge: HOME | End: 2018-03-16
Payer: COMMERCIAL

## 2018-03-16 VITALS
DIASTOLIC BLOOD PRESSURE: 88 MMHG | SYSTOLIC BLOOD PRESSURE: 125 MMHG | RESPIRATION RATE: 18 BRPM | HEART RATE: 68 BPM | TEMPERATURE: 97.6 F

## 2018-03-16 PROCEDURE — 96375 TX/PRO/DX INJ NEW DRUG ADDON: CPT

## 2018-03-16 PROCEDURE — 96366 THER/PROPH/DIAG IV INF ADDON: CPT

## 2018-03-16 PROCEDURE — 96367 TX/PROPH/DG ADDL SEQ IV INF: CPT

## 2018-03-16 PROCEDURE — 96365 THER/PROPH/DIAG IV INF INIT: CPT

## 2018-03-16 RX ORDER — DIPHENHYDRAMINE HCL 25 MG
50 TABLET ORAL ONCE
Status: COMPLETED | OUTPATIENT
Start: 2018-03-16 | End: 2018-03-16

## 2018-03-16 RX ORDER — METHOCARBAMOL 500 MG/1
1000 TABLET, FILM COATED ORAL ONCE
Status: COMPLETED | OUTPATIENT
Start: 2018-03-16 | End: 2018-03-16

## 2018-03-16 RX ORDER — SODIUM CHLORIDE 9 MG/ML
20 INJECTION, SOLUTION INTRAVENOUS CONTINUOUS
Status: DISCONTINUED | OUTPATIENT
Start: 2018-03-16 | End: 2018-03-19 | Stop reason: HOSPADM

## 2018-03-16 RX ADMIN — ONDANSETRON 4 MG: 2 INJECTION INTRAMUSCULAR; INTRAVENOUS at 10:16

## 2018-03-16 RX ADMIN — METHOCARBAMOL 1000 MG: 500 TABLET ORAL at 08:39

## 2018-03-16 RX ADMIN — DIHYDROERGOTAMINE MESYLATE: 1 INJECTION, SOLUTION INTRAMUSCULAR; INTRAVENOUS; SUBCUTANEOUS at 11:01

## 2018-03-16 RX ADMIN — SODIUM CHLORIDE 20 ML/HR: 0.9 INJECTION, SOLUTION INTRAVENOUS at 08:39

## 2018-03-16 RX ADMIN — SODIUM CHLORIDE 250 MG: 9 INJECTION, SOLUTION INTRAVENOUS at 09:35

## 2018-03-16 RX ADMIN — VALPROATE SODIUM 500 MG: 100 INJECTION, SOLUTION INTRAVENOUS at 12:05

## 2018-03-16 RX ADMIN — DIPHENHYDRAMINE HCL 50 MG: 25 TABLET ORAL at 08:39

## 2018-03-16 RX ADMIN — FAMOTIDINE 20 MG: 10 INJECTION, SOLUTION INTRAVENOUS at 08:40

## 2018-03-16 RX ADMIN — MAGNESIUM SULFATE HEPTAHYDRATE: 500 INJECTION, SOLUTION INTRAMUSCULAR; INTRAVENOUS at 12:45

## 2018-03-16 NOTE — PLAN OF CARE
Problem: Potential for Falls  Goal: Patient will remain free of falls  INTERVENTIONS:  - Assess patient frequently for physical needs  -  Identify cognitive and physical deficits and behaviors that affect risk of falls    -  [place] fall precautions as indicated by assessment   - Educate patient/family on patient safety including physical limitations  - Instruct patient to call for assistance with activity based on assessment  - Modify environment to reduce risk of injury  - Consider OT/PT consult to assist with strengthening/mobility   Outcome: Progressing

## 2018-03-16 NOTE — PROGRESS NOTES
Patient arrived on unit for Day #5 of infusion  Patients stated her headache pain is at a "3" in the frontal part of head  Denies any nausea/vomiting/dizzimess   Headache infusion initiated

## 2018-03-16 NOTE — PROGRESS NOTES
Patient tolerated headache infusion  Denies any headache at present time  Denies any nausea/vomiting dizziness  Patient stated she just feels very tired  Patient has f/u with neurology  AVS given to patient  Family driving home

## 2018-03-30 NOTE — PROGRESS NOTES
Patient ID: Tr Burkett is a 66 y o  female  Assessment/Plan:    Intractable chronic migraine without aura and without status migrainosus  Apply for BOTOX, we will contact you when approved to set up injections  Wean Topamax 75 mg for 5 nights, 50 mg for 5 night and 25 mg for 5 nights  Start Lamictal 1 tab for 5 nights, 2 tabs for 5 nights, 3 tabs for 5 nights and 4 tabs at bedtime  Continue Magnesium and B2  Continue Vit B12  Continue Sertraline in am    Abortive: At onset of migraine, take Rizatriptan  May repeat in 2 hours if needed  In addition, take the Prochlorperazine with the first dose of Rizatriptan  Limit the Excedrin to less than 3 times a week           Problem List Items Addressed This Visit        Cardiovascular and Mediastinum    Intractable chronic migraine without aura and without status migrainosus - Primary     Apply for BOTOX, we will contact you when approved to set up injections  Wean Topamax 75 mg for 5 nights, 50 mg for 5 night and 25 mg for 5 nights  Start Lamictal 1 tab for 5 nights, 2 tabs for 5 nights, 3 tabs for 5 nights and 4 tabs at bedtime  Continue Magnesium and B2  Continue Vit B12  Continue Sertraline in am    Abortive: At onset of migraine, take Rizatriptan  May repeat in 2 hours if needed  In addition, take the Prochlorperazine with the first dose of Rizatriptan  Limit the Excedrin to less than 3 times a week           Relevant Medications    prochlorperazine (COMPAZINE) 10 mg tablet    topiramate (TOPAMAX) 25 mg tablet    lamoTRIgine (LaMICtal) 25 mg tablet    ketorolac (TORADOL) 60 mg/2 mL IM injection 60 mg (Start on 4/4/2018 11:15 AM)    Other Relevant Orders    Ambulatory referral to Sleep Medicine       Other    Insomnia    Relevant Orders    Ambulatory referral to Sleep Medicine         Toradol 60 mg IM given in office today for migraine  Follow up 3 months but can change follow up if her BOTOX is approved      Patient has tried and failed multiple classes of medications and has over 15 migraines days a month which last longer than 4 hours each time  Subjective:    HPI  We had the pleasure of evaluating Angelo Palomares in neurological follow up today  As you know she is a 66year old right handed female  She is a  and is here today for evaluation of her headaches  She has history of hypertension, depression and headaches  Patient still complains of headaches which start when she 1st wakes up and can go all the way to an 8-910 a few times a month  She has no relief  In addition, patient needed to remove her Daith earring as it was infected  Followed with ENT for cellulitis from [address] piercing  Completed headache infusions from 3/12/18-3/16/18  States that she has decreased the Excedrin use to not daily        Migraine headaches:  What medications do you take or have you taken for your headaches? Preventative:Toprol, Zoloft, Prednisone (no headaches with steroids) she took it for a week, gabapentin, Topamax, botox (x 2 with no help), amitriptyline, cyproheptadine  Abortive: Excedrin migraine, imitrex, maxalt  Headache trigger: Fatigue, Stress/Tension, chocolate and turkey  How often do the headaches occur - 3-4 a week, greater than 15 migraine days a month  What time of the day do the headaches: states morning she wakes up with a 8-9/10 and as the day progress it does get better but at time it does not     How long do the headaches last: sometimes all day other time few hours a day  Where are they located: frontal, temporal and jaw pain  What is the intensity of pain: 3-9/10, 9/10 once a week, Average 4-5/10  Describe your usual headache - Throbbing, pressure, achy, shooting, stabbing  Associated symptoms: photophobia, phonophobia, sensitivity to smell, nausea, vomiting, concentration problems, flushing of face, light-headed or dizzy, prefer to be alone and in a dark room, unable to work     Vit D, B12, homocysteine, TSH normal 12/17 12/27/17 MRI C spine  IMPRESSION:  Mild spondylotic changes are similar to the previous study      12/27/17 MRI Brain  IMPRESSION:  No acute infarction, intracranial hemorrhage or mass  Sleep is interrupted  Has some difficulty falling asleep and wakes up multiple times through the night         The following portions of the patient's history were reviewed and updated as appropriate: allergies, current medications, past family history, past medical history, past social history, past surgical history and problem list          Objective:    Blood pressure 118/78, pulse 64, height 5' 3" (1 6 m), weight 64 2 kg (141 lb 9 6 oz)  Physical Exam   Constitutional: She appears well-developed and well-nourished  HENT:   Head: Normocephalic and atraumatic  Eyes: EOM are normal  Pupils are equal, round, and reactive to light  Neck: Normal range of motion  Cardiovascular: Normal rate  Pulmonary/Chest: Effort normal    Musculoskeletal: Normal range of motion  Neurological: She has normal strength and normal reflexes  Gait and coordination normal    Skin: Skin is warm and dry  Psychiatric: She has a normal mood and affect  Her speech is normal    Nursing note and vitals reviewed  Neurological Exam    Mental Status  The patient is alert and oriented to person, place, time, and situation  Her recent and remote memory are normal  She has no visuospatial neglect  Her speech is normal  Her language is fluent with no aphasia  She has normal attention span and concentration  She has a normal fund of knowledge  Cranial Nerves    CN II: The patient's visual acuity and visual fields are normal   CN III, IV, VI: The patient's pupils are equally round and reactive to light and ocular movements are normal   CN V: The patient has normal facial sensation  CN VII:  The patient has symmetric facial movement  CN VIII:  The patient's hearing is normal   CN IX, X: The patient has symmetric palate movement and normal gag reflex    CN XI: The patient's shoulder shrug strength is normal   CN XII: The patient's tongue is midline without atrophy or fasciculations  Motor  The patient has normal muscle bulk throughout  Her overall muscle tone is normal throughout  Her strength is 5/5 throughout all four extremities  Sensory  The patient's sensation is normal in all four extremities  She has normal cortical sensation    Reflexes  Deep tendon reflexes are 2+ and symmetric in all four extremities with downgoing toes bilaterally  Gait and Coordination  The patient has normal gait and station and normal casual, toe, heel, and tandem gait  She has normal coordination bilaterally  ROS:    Review of Systems   Constitutional: Negative  HENT: Positive for sinus pressure  Eyes: Negative  Respiratory: Negative  Cardiovascular: Negative  Gastrointestinal: Negative  Endocrine: Negative  Genitourinary: Negative  Musculoskeletal: Negative  Skin: Negative  Allergic/Immunologic: Negative  Neurological: Positive for dizziness, light-headedness, numbness and headaches  Hematological: Bruises/bleeds easily  Psychiatric/Behavioral: Positive for agitation and sleep disturbance  The patient is nervous/anxious           Depression

## 2018-04-04 ENCOUNTER — OFFICE VISIT (OUTPATIENT)
Dept: NEUROLOGY | Facility: CLINIC | Age: 58
End: 2018-04-04
Payer: COMMERCIAL

## 2018-04-04 VITALS
SYSTOLIC BLOOD PRESSURE: 118 MMHG | HEIGHT: 63 IN | WEIGHT: 141.6 LBS | HEART RATE: 64 BPM | DIASTOLIC BLOOD PRESSURE: 78 MMHG | BODY MASS INDEX: 25.09 KG/M2

## 2018-04-04 DIAGNOSIS — G43.719 INTRACTABLE CHRONIC MIGRAINE WITHOUT AURA AND WITHOUT STATUS MIGRAINOSUS: Primary | ICD-10-CM

## 2018-04-04 DIAGNOSIS — G47.00 INSOMNIA, UNSPECIFIED TYPE: ICD-10-CM

## 2018-04-04 PROCEDURE — 99214 OFFICE O/P EST MOD 30 MIN: CPT | Performed by: PHYSICIAN ASSISTANT

## 2018-04-04 PROCEDURE — 96372 THER/PROPH/DIAG INJ SC/IM: CPT | Performed by: PHYSICIAN ASSISTANT

## 2018-04-04 RX ORDER — OMEPRAZOLE 40 MG/1
40 CAPSULE, DELAYED RELEASE ORAL DAILY
Refills: 0 | COMMUNITY
Start: 2018-03-20

## 2018-04-04 RX ORDER — KETOROLAC TROMETHAMINE 30 MG/ML
60 INJECTION, SOLUTION INTRAMUSCULAR; INTRAVENOUS ONCE
Status: COMPLETED | OUTPATIENT
Start: 2018-04-04 | End: 2018-04-04

## 2018-04-04 RX ORDER — LAMOTRIGINE 25 MG/1
TABLET ORAL
Qty: 120 TABLET | Refills: 0 | Status: SHIPPED | OUTPATIENT
Start: 2018-04-04 | End: 2018-06-22 | Stop reason: SDUPTHER

## 2018-04-04 RX ORDER — PROCHLORPERAZINE MALEATE 10 MG
10 TABLET ORAL EVERY 6 HOURS PRN
Qty: 30 TABLET | Refills: 0 | Status: SHIPPED | OUTPATIENT
Start: 2018-04-04 | End: 2019-08-20 | Stop reason: ALTCHOICE

## 2018-04-04 RX ORDER — TOPIRAMATE 25 MG/1
TABLET ORAL
Qty: 40 TABLET | Refills: 0 | Status: SHIPPED | OUTPATIENT
Start: 2018-04-04 | End: 2018-08-30 | Stop reason: ALTCHOICE

## 2018-04-04 RX ADMIN — KETOROLAC TROMETHAMINE 60 MG: 30 INJECTION, SOLUTION INTRAMUSCULAR; INTRAVENOUS at 11:17

## 2018-04-04 NOTE — ASSESSMENT & PLAN NOTE
Apply for BOTOX, we will contact you when approved to set up injections  Wean Topamax 75 mg for 5 nights, 50 mg for 5 night and 25 mg for 5 nights  Start Lamictal 1 tab for 5 nights, 2 tabs for 5 nights, 3 tabs for 5 nights and 4 tabs at bedtime  Continue Magnesium and B2  Continue Vit B12  Continue Sertraline in am    Abortive: At onset of migraine, take Rizatriptan    May repeat in 2 hours if needed  In addition, take the Prochlorperazine with the first dose of Rizatriptan  Limit the Excedrin to less than 3 times a week

## 2018-04-04 NOTE — PATIENT INSTRUCTIONS
Preventative:  Apply for BOTOX, we will contact you when approved to set up injections  Wean Topamax 75 mg for 5 nights, 50 mg for 5 night and 25 mg for 5 nights  Start Lamictal 1 tab for 5 nights, 2 tabs for 5 nights, 3 tabs for 5 nights and 4 tabs at bedtime  Continue Magnesium and B2  Continue Vit B12  Continue Sertraline in am    Abortive: At onset of migraine, take Rizatriptan  May repeat in 2 hours if needed  In addition, take the Prochlorperazine with the first dose of Rizatriptan  Limit the Excedrin to less than 3 times a week    May take these over-the-counter supplements to decrease intensity and frequency of migraines  - Magnesium Oxide 400-500 mg a day  If any diarrhea or upset stomach, decrease dose  as tolerated  -  B2 200 mg a day  May take once a day in am  This supplement will change the color of the urine to fluorescent yellow no matter how hydrated, which is normal      Discussed side effects of all medications prescribed today to the patient in detail  Patient education was completed today and we also discussed precautions for rebound headaches  When patient has a moderate to severe headache, they should seek rest, initiate relaxation and apply cold compresses to the head  1  Maintain regular sleep schedule  Adults need at least 7-8 hours of uninterrupted a night  2  Limit over the counter medications such as Tylenol, Ibuprofen, Aleve, Excedrin  (No more than 3 times a week)  3  Maintain headache diary  We discussed an FORREST for a smart phone is "Migraine eDiary"  4  Limit caffeine to 1-2 cups a day or less  5  Avoid dietary trigger  (list given to the patient and reviewed with them)  6  Patient is to have regular frequent meals to prevent headache onset  7   Please drink at least 64 ounces of water a day to help remain hydrated  We will refer to sleep medicine for further work up on your difficulty sleeping

## 2018-04-23 ENCOUNTER — TELEPHONE (OUTPATIENT)
Dept: NEUROLOGY | Facility: CLINIC | Age: 58
End: 2018-04-23

## 2018-04-23 DIAGNOSIS — G43.709 CHRONIC MIGRAINE WITHOUT AURA WITHOUT STATUS MIGRAINOSUS, NOT INTRACTABLE: Primary | ICD-10-CM

## 2018-04-24 ENCOUNTER — TELEPHONE (OUTPATIENT)
Dept: NEUROLOGY | Facility: CLINIC | Age: 58
End: 2018-04-24

## 2018-04-24 NOTE — TELEPHONE ENCOUNTER
Per Jourdan, pts Botox order is still under the insur  Verif  Stage but is marked as a STAT order  I will check on this order status tomm

## 2018-04-25 NOTE — TELEPHONE ENCOUNTER
Per Jourdan, pts 200 units of Botox is set up to arrive in [facility]  This Fri  The 27th  Please await delivery and please let me know if Toxin arrives or not  Thanks!!!   Maco

## 2018-04-27 ENCOUNTER — DOCUMENTATION (OUTPATIENT)
Dept: NEUROLOGY | Facility: CLINIC | Age: 58
End: 2018-04-27

## 2018-05-02 ENCOUNTER — PROCEDURE VISIT (OUTPATIENT)
Dept: NEUROLOGY | Facility: CLINIC | Age: 58
End: 2018-05-02
Payer: COMMERCIAL

## 2018-05-02 VITALS — SYSTOLIC BLOOD PRESSURE: 131 MMHG | DIASTOLIC BLOOD PRESSURE: 69 MMHG | HEART RATE: 60 BPM | TEMPERATURE: 98 F

## 2018-05-02 DIAGNOSIS — G43.709 CHRONIC MIGRAINE WITHOUT AURA WITHOUT STATUS MIGRAINOSUS, NOT INTRACTABLE: Primary | ICD-10-CM

## 2018-05-02 DIAGNOSIS — G43.719 INTRACTABLE CHRONIC MIGRAINE WITHOUT AURA AND WITHOUT STATUS MIGRAINOSUS: ICD-10-CM

## 2018-05-02 PROCEDURE — 64615 CHEMODENERV MUSC MIGRAINE: CPT | Performed by: PSYCHIATRY & NEUROLOGY

## 2018-05-02 RX ORDER — DEXAMETHASONE 2 MG/1
TABLET ORAL
Qty: 5 TABLET | Refills: 0 | Status: SHIPPED | OUTPATIENT
Start: 2018-05-02 | End: 2018-08-30 | Stop reason: SDUPTHER

## 2018-05-02 NOTE — PROGRESS NOTES
Chemodenervation  Date/Time: 5/2/2018 1:14 PM  Performed by: Alma Bennett by: America Early details:     Prepped With: Alcohol    Anesthesia (see MAR for exact dosages): Anesthesia method:  None  Procedure details:     Position:  Upright  Botox:     Botox Type:  Type A    Brand:  Botox    mL's of Botulinum Toxin:  200    Final Concentration per CC:  200 units    Needle Gauge:  30 G 2 5 inch  Procedures:     Botox Procedures: chronic headache      Indications: migraines    Injection Location:     Head / Face:  L superior cervical paraspinal, R superior cervical paraspinal, L , R , L frontalis, R frontalis, L medial occipitalis, R medial occipitalis, procerus, R temporalis, L temporalis, R superior trapezius and L superior trapezius    L  injection amount:  5 unit(s)    R  injection amount:  5 unit(s)    L lateral frontalis:  5 unit(s)    R lateral frontalis:  5 unit(s)    L medial frontalis:  5 unit(s)    R medial frontalis:  5 unit(s)    L temporalis injection amount:  20 unit(s)    R temporalis injection amount:  20 unit(s)    Procerus injection amount:  5 unit(s)    L medial occipitalis injection amount:  15 unit(s)    R medial occipitalis injection amount:  15 unit(s)    L superior cervical paraspinal injection amount:  10 unit(s)    R superior cervical paraspinal injection amount:  10 unit(s)    L superior trapezius injection amount:  15 unit(s)    R superior trapezius injection amount:  15 unit(s)  Total Units:     Total units used:  200    Total units discarded:  0  Post-procedure details:     Chemodenervation:  Chronic migraine    Patient tolerance of procedure: Tolerated well, no immediate complications  Comments:      35 units in the temporal region bilaterally  Which was medically necessary

## 2018-06-04 DIAGNOSIS — G43.719 INTRACTABLE CHRONIC MIGRAINE WITHOUT AURA AND WITHOUT STATUS MIGRAINOSUS: ICD-10-CM

## 2018-06-04 NOTE — TELEPHONE ENCOUNTER
Pt requesting lamictal refill, I ordered 100mg tabs, 1 tab Qhs for ease of admin; please sign off if agreeable

## 2018-06-05 RX ORDER — LAMOTRIGINE 100 MG/1
TABLET ORAL
Qty: 30 TABLET | Refills: 3 | Status: SHIPPED | OUTPATIENT
Start: 2018-06-05 | End: 2018-08-30 | Stop reason: ALTCHOICE

## 2018-06-22 DIAGNOSIS — G43.719 INTRACTABLE CHRONIC MIGRAINE WITHOUT AURA AND WITHOUT STATUS MIGRAINOSUS: ICD-10-CM

## 2018-06-22 RX ORDER — LAMOTRIGINE 25 MG/1
TABLET ORAL
Qty: 120 TABLET | Refills: 0 | Status: SHIPPED | OUTPATIENT
Start: 2018-06-22 | End: 2018-07-31 | Stop reason: SDUPTHER

## 2018-07-10 ENCOUNTER — TELEPHONE (OUTPATIENT)
Dept: NEUROLOGY | Facility: CLINIC | Age: 58
End: 2018-07-10

## 2018-07-10 NOTE — TELEPHONE ENCOUNTER
Spoke with rep Britney Calle and set up delivery for Botox 200 unit single dose vial quantity of 1 to [place] office on Tuesday 7/31/18  I will await shipment

## 2018-07-10 NOTE — TELEPHONE ENCOUNTER
Referral Notes   Number of Notes: 1   Type Date User Summary Attachment   General 07/02/2018  2:59 PM Sandi Foley care coordination -   Note    Botox authorization number #Case - [id number] - valid until 4/5/2019 - please use [facility]

## 2018-07-18 NOTE — TELEPHONE ENCOUNTER
Patient called back stating she will be on vacation and r/s appt on 8/16/18 at 3 pm  Patient agreeable to date/time/location

## 2018-07-31 DIAGNOSIS — G43.719 INTRACTABLE CHRONIC MIGRAINE WITHOUT AURA AND WITHOUT STATUS MIGRAINOSUS: ICD-10-CM

## 2018-07-31 RX ORDER — LAMOTRIGINE 25 MG/1
TABLET ORAL
Qty: 120 TABLET | Refills: 0 | Status: SHIPPED | OUTPATIENT
Start: 2018-07-31 | End: 2018-08-30 | Stop reason: ALTCHOICE

## 2018-08-15 ENCOUNTER — TELEPHONE (OUTPATIENT)
Dept: NEUROLOGY | Facility: CLINIC | Age: 58
End: 2018-08-15

## 2018-08-15 NOTE — TELEPHONE ENCOUNTER
Pt called asking if we receive her botox  Per Maco, botox received and "everything is good to go"  Pt made aware    Confirmed botox appt 8/16/18 @ 3 pm

## 2018-08-16 ENCOUNTER — PROCEDURE VISIT (OUTPATIENT)
Dept: NEUROLOGY | Facility: CLINIC | Age: 58
End: 2018-08-16
Payer: COMMERCIAL

## 2018-08-16 VITALS — TEMPERATURE: 98.7 F | HEART RATE: 83 BPM | SYSTOLIC BLOOD PRESSURE: 139 MMHG | DIASTOLIC BLOOD PRESSURE: 83 MMHG

## 2018-08-16 DIAGNOSIS — G43.709 CHRONIC MIGRAINE WITHOUT AURA WITHOUT STATUS MIGRAINOSUS, NOT INTRACTABLE: Primary | ICD-10-CM

## 2018-08-16 PROCEDURE — 64615 CHEMODENERV MUSC MIGRAINE: CPT | Performed by: PHYSICIAN ASSISTANT

## 2018-08-16 RX ORDER — DEXAMETHASONE 2 MG/1
2 TABLET ORAL
Qty: 5 TABLET | Refills: 0 | Status: SHIPPED | OUTPATIENT
Start: 2018-08-16 | End: 2018-08-30 | Stop reason: ALTCHOICE

## 2018-08-16 RX ORDER — RIZATRIPTAN BENZOATE 10 MG/1
10 TABLET, ORALLY DISINTEGRATING ORAL AS NEEDED
Qty: 9 TABLET | Refills: 0 | Status: SHIPPED | OUTPATIENT
Start: 2018-08-16 | End: 2018-12-31 | Stop reason: SDUPTHER

## 2018-08-16 NOTE — PROGRESS NOTES
Chemodenervation  Date/Time: 8/16/2018 3:28 PM  Performed by: Manuel Davis  Authorized by: Quita Tinajero details:     Prepped With: Alcohol    Anesthesia (see MAR for exact dosages): Anesthesia method:  None  Procedure details:     Position:  Upright  Botox:     Botox Type:  Type A    Brand:  Botox    mL's of Botulinum Toxin:  200    Final Concentration per CC:  200 units    Needle Gauge:  30 G 2 5 inch  Procedures:     Botox Procedures: chronic headache      Indications: migraines    Injection Location:     Head / Face:  L superior cervical paraspinal, R superior cervical paraspinal, L , R , L frontalis, R frontalis, L medial occipitalis, R medial occipitalis, procerus, R temporalis, L temporalis, R superior trapezius and L superior trapezius    L  injection amount:  5 unit(s)    R  injection amount:  5 unit(s)    L lateral frontalis:  5 unit(s)    R lateral frontalis:  5 unit(s)    L medial frontalis:  5 unit(s)    R medial frontalis:  5 unit(s)    L temporalis injection amount:  20 unit(s)    R temporalis injection amount:  20 unit(s)    Procerus injection amount:  5 unit(s)    L medial occipitalis injection amount:  15 unit(s)    R medial occipitalis injection amount:  15 unit(s)    L superior cervical paraspinal injection amount:  10 unit(s)    R superior cervical paraspinal injection amount:  10 unit(s)    L superior trapezius injection amount:  15 unit(s)    R superior trapezius injection amount:  15 unit(s)  Total Units:     Total units used:  200    Total units discarded:  0  Post-procedure details:     Chemodenervation:  Chronic migraine    Facial Nerve Location[de-identified]  Bilateral facial nerve    Patient tolerance of procedure:   Tolerated well, no immediate complications  Comments:      5 units orbicularis oculi bilaterally  35 units frontalis and temporalis bilaterally  All medically necessary

## 2018-08-30 ENCOUNTER — OFFICE VISIT (OUTPATIENT)
Dept: NEUROLOGY | Facility: CLINIC | Age: 58
End: 2018-08-30
Payer: COMMERCIAL

## 2018-08-30 VITALS
HEART RATE: 83 BPM | SYSTOLIC BLOOD PRESSURE: 140 MMHG | BODY MASS INDEX: 26.13 KG/M2 | DIASTOLIC BLOOD PRESSURE: 90 MMHG | WEIGHT: 147.5 LBS | HEIGHT: 63 IN

## 2018-08-30 DIAGNOSIS — G43.719 INTRACTABLE CHRONIC MIGRAINE WITHOUT AURA AND WITHOUT STATUS MIGRAINOSUS: Primary | ICD-10-CM

## 2018-08-30 PROCEDURE — 99214 OFFICE O/P EST MOD 30 MIN: CPT | Performed by: PSYCHIATRY & NEUROLOGY

## 2018-08-30 PROCEDURE — 96372 THER/PROPH/DIAG INJ SC/IM: CPT | Performed by: PSYCHIATRY & NEUROLOGY

## 2018-08-30 RX ORDER — DIVALPROEX SODIUM 250 MG/1
TABLET, DELAYED RELEASE ORAL
Qty: 60 TABLET | Refills: 3 | Status: SHIPPED | OUTPATIENT
Start: 2018-08-30 | End: 2018-12-31 | Stop reason: SDUPTHER

## 2018-08-30 RX ORDER — KETOROLAC TROMETHAMINE 30 MG/ML
60 INJECTION, SOLUTION INTRAMUSCULAR; INTRAVENOUS ONCE
Status: COMPLETED | OUTPATIENT
Start: 2018-08-30 | End: 2018-08-30

## 2018-08-30 RX ORDER — OLANZAPINE 5 MG/1
TABLET ORAL
Qty: 30 TABLET | Refills: 3 | Status: SHIPPED | OUTPATIENT
Start: 2018-08-30 | End: 2018-12-20 | Stop reason: SDUPTHER

## 2018-08-30 RX ORDER — DEXAMETHASONE 1 MG
TABLET ORAL
Qty: 10 TABLET | Refills: 0 | Status: SHIPPED | OUTPATIENT
Start: 2018-08-30 | End: 2018-08-30

## 2018-08-30 RX ADMIN — KETOROLAC TROMETHAMINE 60 MG: 30 INJECTION, SOLUTION INTRAMUSCULAR; INTRAVENOUS at 13:39

## 2018-08-30 NOTE — PROGRESS NOTES
Patient ID: Adelina Munoz is a 66 y o  female  Assessment/Plan:   Problem List Items Addressed This Visit        Cardiovascular and Mediastinum    Intractable chronic migraine without aura and without status migrainosus - Primary    Relevant Medications    ketorolac (TORADOL) 60 mg/2 mL IM injection 60 mg (Completed)    prochlorperazine edisylate (COMPAZINE) injection 10 mg    OLANZapine (ZyPREXA) 5 mg tablet    divalproex sodium (DEPAKOTE) 250 mg EC tablet          Preventive therapy for migraine headaches:   - Will wean patient off of her Lamictal by having her take 50 mg for 5 days then stop  - She is to start taking Depakote 5 days after she is off of her Lamictal     - Start taking olanzapine 5 mg at bedtime daily  Abortive therapy for migraine headaches:   - at the onset of a migraine headache she is to take Maxalt sublingual 10 mg with Compazine 10 mg  If in 2 hours her headache has not improved she has take Decadron 1 mg  If this does not improve her headache patient to call our office  We will see patient back in 2 months time  She is to complete 1 more Botox and if it still does not helps then she is stop getting it    Consider the new CGRP drug  Subjective:  HPI  We had the pleasure of evaluating Adelina Munoz in neurological follow up today  As you know she is a 66year old right handed female  She is a  and is here today for evaluation of her headaches  She has history of hypertension, depression and headaches  Daith piercing:    - In addition, patient needed to remove her Daith earring as it was infected   Followed with ENT for cellulitis from [address] piercing  Sleep is interrupted: Has some difficulty falling asleep and wakes up multiple times through the night  Chronic Migraine headaches with and without aura:    What medications do you take or have you taken for your headaches?    Preventative:  - Toprol,   - Zoloft, amitriptyline,  - Cyproheptadine, Prednisone (no headaches with steroids) she took it for a week,  botox (x 2 with no help),    - Gabapentin, Topamax  Abortive: Excedrin migraine, imitrex, maxalt    Headache trigger: Fatigue, Stress/Tension, chocolate and turkey      She came in today with the pain of 9/10 and was at 5/10 at the time of discharge  How often do the headaches occur - 3-4 a week, greater than 15 migraine days a month   Her current     headache started on      Tuesday and has been persistent  Patient states initially on up to 10/10  She has     stopped taking her daily medication  Has not taken any over-the-counter medication for the last one week  What time of the day do the headaches: states morning she wakes up with a 8-9/10 and as the day progress it does get better but at time it does not  How long do the headaches last: sometimes all day other time few hours a day  Where are they located: frontal, temporal and jaw pain  What is the intensity of pain: 3-9/10, 9/10 once a week, Average 4-5/10  Describe your usual headache - Throbbing, pressure, achy, shooting, stabbing  Associated symptoms: photophobia, phonophobia, sensitivity to smell, nausea, vomiting, concentration problems, flushing of face, light-headed or dizzy, prefer to be alone and in a dark room, unable to work     Work up completed:   Vit D, B12, homocysteine, TSH normal 12/17 12/27/17 MRI C spine  IMPRESSION:  Mild spondylotic changes are similar to the previous study          12/27/17 MRI Brain  IMPRESSION:  No acute infarction, intracranial hemorrhage or mass             The following portions of the patient's history were reviewed and updated as appropriate: allergies, current medications, past family history, past medical history, past social history, past surgical history and problem list          Objective:    Blood pressure 140/90, pulse 83, height 5' 3" (1 6 m), weight 66 9 kg (147 lb 8 oz)      Physical Exam   Constitutional: She appears well-developed  Eyes: EOM are normal  Pupils are equal, round, and reactive to light  Neck: Normal range of motion  Neck supple  Cardiovascular: Normal rate  Pulmonary/Chest: Effort normal    Abdominal: Soft  Musculoskeletal: Normal range of motion  Neurological: She has normal strength and normal reflexes  Gait and coordination normal    Skin: Skin is warm  Psychiatric: She has a normal mood and affect  Her speech is normal        Neurological Exam    Mental Status  The patient is alert  Her speech is normal  Her language is fluent with no aphasia  She has normal attention span and concentration  Cranial Nerves    CN II: The patient's visual acuity and visual fields are normal   CN III, IV, VI: The patient's pupils are equally round and reactive to light and ocular movements are normal   CN V: The patient has normal facial sensation  CN VII:  The patient has symmetric facial movement  CN VIII:  The patient's hearing is normal   CN IX, X: The patient has symmetric palate movement and normal gag reflex  CN XI: The patient's shoulder shrug strength is normal   CN XII: The patient's tongue is midline without atrophy or fasciculations  Motor   Her strength is 5/5 throughout all four extremities  Sensory  The patient's sensation is normal in all four extremities  Reflexes  Deep tendon reflexes are 2+ and symmetric in all four extremities with downgoing toes bilaterally  Gait and Coordination  The patient has normal gait and station and normal casual, toe, heel, and tandem gait  She has normal coordination bilaterally  ROS:    Review of Systems   Constitutional: Negative  Negative for appetite change and fever  HENT: Positive for ear pain  Negative for hearing loss, tinnitus, trouble swallowing and voice change  Eyes: Negative  Negative for photophobia and pain  Respiratory: Negative  Negative for shortness of breath  Cardiovascular: Negative    Negative for palpitations  Gastrointestinal: Positive for abdominal pain, nausea and vomiting  Endocrine: Negative  Negative for cold intolerance and heat intolerance  Genitourinary: Negative  Negative for dysuria, frequency and urgency  Musculoskeletal: Positive for neck pain  Negative for myalgias  Right arm pain   Skin: Negative  Negative for rash  Neurological: Positive for headaches  Negative for dizziness, tremors, seizures, syncope, facial asymmetry, speech difficulty, weakness, light-headedness and numbness  Hematological: Negative  Does not bruise/bleed easily  Psychiatric/Behavioral: Positive for sleep disturbance  Negative for confusion and hallucinations

## 2018-08-30 NOTE — PATIENT INSTRUCTIONS
Preventive therapy for migraine headaches:   - Will wean patient off of her Lamictal by having her take 50 mg for 5 days then stop  - She is to start taking Depakote 5 days after she is off of her Lamictal     - Start taking olanzapine 5 mg at bedtime daily  Abortive therapy for migraine headaches:   - at the onset of a migraine headache she is to take Maxalt sublingual 10 mg with Compazine 10 mg  If in 2 hours her headache has not improved she has take Decadron 1 mg  If this does not improve her headache patient to call our office  We will see patient back in 2 months time  She is to complete 1 more Botox and if it still does not helps then she is stop getting it    Consider the new CGRP drug

## 2018-09-02 DIAGNOSIS — G43.719 INTRACTABLE CHRONIC MIGRAINE WITHOUT AURA AND WITHOUT STATUS MIGRAINOSUS: ICD-10-CM

## 2018-09-04 ENCOUNTER — TELEPHONE (OUTPATIENT)
Dept: NEUROLOGY | Facility: CLINIC | Age: 58
End: 2018-09-04

## 2018-09-04 ENCOUNTER — CLINICAL SUPPORT (OUTPATIENT)
Dept: NEUROLOGY | Facility: CLINIC | Age: 58
End: 2018-09-04
Payer: COMMERCIAL

## 2018-09-04 DIAGNOSIS — G43.701 CHRONIC MIGRAINE WITHOUT AURA WITH STATUS MIGRAINOSUS, NOT INTRACTABLE: Primary | ICD-10-CM

## 2018-09-04 RX ORDER — LAMOTRIGINE 25 MG/1
TABLET ORAL
Qty: 120 TABLET | Refills: 0 | OUTPATIENT
Start: 2018-09-04

## 2018-09-04 RX ORDER — KETOROLAC TROMETHAMINE 30 MG/ML
60 INJECTION, SOLUTION INTRAMUSCULAR; INTRAVENOUS ONCE
Status: COMPLETED | OUTPATIENT
Start: 2018-09-04 | End: 2018-09-04

## 2018-09-04 RX ADMIN — KETOROLAC TROMETHAMINE 60 MG: 30 INJECTION, SOLUTION INTRAMUSCULAR; INTRAVENOUS at 15:35

## 2018-09-04 NOTE — TELEPHONE ENCOUNTER
Patient called in with migraine complaint-    Migraine started this morning  Throbbing pain in front forehead  Vomiting, nausea, light and sound sensitivity  10/10 pain    Patient not willing to discuss abortive medications tried or preventatives medications that she is on  Pt states that she took a steroid and a medication that dissolves on the tongue   She states, "I don't know what the medications are lady and I can't answer all these questions " She is asking to "come in for a shot today "    [phone]

## 2018-09-12 DIAGNOSIS — G43.709 CHRONIC MIGRAINE WITHOUT AURA WITHOUT STATUS MIGRAINOSUS, NOT INTRACTABLE: Primary | ICD-10-CM

## 2018-09-12 RX ORDER — KETOROLAC TROMETHAMINE 30 MG/ML
60 INJECTION, SOLUTION INTRAMUSCULAR; INTRAVENOUS ONCE
Qty: 10 ML | Refills: 6 | Status: SHIPPED | OUTPATIENT
Start: 2018-09-12 | End: 2020-11-04 | Stop reason: SDUPTHER

## 2018-09-14 DIAGNOSIS — G43.709 CHRONIC MIGRAINE WITHOUT AURA WITHOUT STATUS MIGRAINOSUS, NOT INTRACTABLE: Primary | ICD-10-CM

## 2018-09-14 RX ORDER — SYRINGE W-NEEDLE,DISPOSAB,3 ML 25GX5/8"
SYRINGE, EMPTY DISPOSABLE MISCELLANEOUS AS NEEDED
Qty: 5 EACH | Refills: 0 | Status: SHIPPED | OUTPATIENT
Start: 2018-09-14 | End: 2019-09-19 | Stop reason: SDUPTHER

## 2018-09-22 DIAGNOSIS — G43.719 INTRACTABLE CHRONIC MIGRAINE WITHOUT AURA AND WITHOUT STATUS MIGRAINOSUS: ICD-10-CM

## 2018-09-24 RX ORDER — LAMOTRIGINE 100 MG/1
TABLET ORAL
Qty: 30 TABLET | Refills: 3 | OUTPATIENT
Start: 2018-09-24

## 2018-10-17 ENCOUNTER — TELEPHONE (OUTPATIENT)
Dept: NEUROLOGY | Facility: CLINIC | Age: 58
End: 2018-10-17

## 2018-10-17 NOTE — TELEPHONE ENCOUNTER
Referral Notes   Number of Notes: 1   Type Date User Summary Attachment   General 10/16/2018  2:33 PM [facility] coordination -   Note    Botox authorization [id number] - valid until 4/5/2019 - please use [facility] specialty pharmacy

## 2018-10-22 NOTE — TELEPHONE ENCOUNTER
Per Sera Hood at [place] pts Botox order has to go thru insur verif  Before they can set up a shipment date  I will f/u in 1-2 days to check status of order

## 2018-10-23 ENCOUNTER — TELEPHONE (OUTPATIENT)
Dept: NEUROLOGY | Facility: CLINIC | Age: 58
End: 2018-10-23

## 2018-11-20 ENCOUNTER — PROCEDURE VISIT (OUTPATIENT)
Dept: NEUROLOGY | Facility: CLINIC | Age: 58
End: 2018-11-20
Payer: COMMERCIAL

## 2018-11-20 VITALS — DIASTOLIC BLOOD PRESSURE: 80 MMHG | SYSTOLIC BLOOD PRESSURE: 118 MMHG | HEART RATE: 62 BPM | TEMPERATURE: 98.3 F

## 2018-11-20 DIAGNOSIS — G43.709 CHRONIC MIGRAINE WITHOUT AURA WITHOUT STATUS MIGRAINOSUS, NOT INTRACTABLE: Primary | ICD-10-CM

## 2018-11-20 DIAGNOSIS — G43.109 MIGRAINE WITH AURA AND WITHOUT STATUS MIGRAINOSUS, NOT INTRACTABLE: ICD-10-CM

## 2018-11-20 PROCEDURE — 64615 CHEMODENERV MUSC MIGRAINE: CPT | Performed by: PHYSICIAN ASSISTANT

## 2018-11-20 NOTE — PROGRESS NOTES
Chemodenervation  Date/Time: 11/20/2018 2:44 PM  Performed by: Maik Hawk  Authorized by: Jackie Outlaw details:     Preparation: Patient was prepped and draped in usual sterile fashion      Prepped With: Alcohol    Anesthesia (see MAR for exact dosages): Anesthesia method:  None  Procedure details:     Position:  Upright  Botox:     Botox Type:  Type A    Brand:  Botox    mL's of Botulinum Toxin:  200    Final Concentration per CC:  200 units    Needle Gauge:  30 G 2 5 inch  Procedures:     Botox Procedures: chronic headache      Indications: migraines    Injection Location:     Head / Face:  L superior cervical paraspinal, R superior cervical paraspinal, L , R , L frontalis, R frontalis, procerus, R medial occipitalis, L medial occipitalis, L temporalis, R temporalis, L superior trapezius and R superior trapezius    L  injection amount:  5 unit(s)    R  injection amount:  5 unit(s)    L medial frontalis:  10 unit(s)    R medial frontalis:  10 unit(s)    L temporalis injection amount:  20 unit(s)    R temporalis injection amount:  20 unit(s)    Procerus injection amount:  5 unit(s)    L medial occipitalis injection amount:  15 unit(s)    R medial occipitalis injection amount:  15 unit(s)    L superior cervical paraspinal injection amount:  10 unit(s)    R superior cervical paraspinal injection amount:  10 unit(s)    L superior trapezius injection amount:  15 unit(s)    R superior trapezius injection amount:  15 unit(s)  Total Units:     Total units used:  200    Total units discarded:  0  Post-procedure details:     Chemodenervation:  Chronic migraine    Facial Nerve Location[de-identified]  Bilateral facial nerve    Patient tolerance of procedure:   Tolerated well, no immediate complications  Comments:      5 units orbicularis oculi bilaterally  35 units frontalis and temporalis left > right  All medically necessary

## 2018-12-20 DIAGNOSIS — G43.719 INTRACTABLE CHRONIC MIGRAINE WITHOUT AURA AND WITHOUT STATUS MIGRAINOSUS: ICD-10-CM

## 2018-12-20 RX ORDER — OLANZAPINE 5 MG/1
TABLET ORAL
Qty: 30 TABLET | Refills: 3 | Status: SHIPPED | OUTPATIENT
Start: 2018-12-20 | End: 2019-08-20 | Stop reason: ALTCHOICE

## 2018-12-28 NOTE — PROGRESS NOTES
Patient ID: Faustino Bill is a 66 y o  female  Assessment/Plan:   Problem List Items Addressed This Visit        Cardiovascular and Mediastinum    Intractable chronic migraine without aura and without status migrainosus    Relevant Medications    divalproex sodium (DEPAKOTE) 250 mg EC tablet    topiramate (TOPAMAX) 50 MG tablet    cyproheptadine (PERIACTIN) 4 mg tablet    rizatriptan (MAXALT-MLT) 10 MG disintegrating tablet      Other Visit Diagnoses     Chronic migraine without aura without status migrainosus, not intractable        Relevant Medications    divalproex sodium (DEPAKOTE) 250 mg EC tablet    topiramate (TOPAMAX) 50 MG tablet    cyproheptadine (PERIACTIN) 4 mg tablet    rizatriptan (MAXALT-MLT) 10 MG disintegrating tablet              Preventive therapy for migraine headaches:   - she is to continue taking Depakote 500mg at bedtime    -  She is to take olanzapine but as needed to break a headaches at night time only and that is the same for cyproheptadine    - given she has gained 20 pounds I will have her go back to taking Topamax 50 mg twice a day  - Emgality 120 mg two injection the first time and ten 30 days later she is to do one injection  Abortive therapy for migraine headaches:   - at the onset of a migraine headache she is to take Maxalt sublingual 10 mg with Compazine 10 mg  If in 2 hours her headache has not improved she has take Decadron 1 mg  If this does not improve her headache patient to call our office          Subjective:  HPI  We had the pleasure of evaluating Faustino Bill in neurological follow up today  As you know she is a 66year old right handed female  She is a  and is here today for evaluation of her headaches  She has history of hypertension, depression and headaches  At last visit Topamax was increased and cyproheptadine was added  This provided no help    Patient still complains of headaches which start when she 1st wakes up at a 6/10 and go all the way to a 10/10 a few times a month  She has no relief  She takes her Excedrin migraine every day without fail  In addition, patient needed to remove her Daith earring as it was infected  Patient was not placed on any antibiotics      Migraine headaches :  What medications do you take or have you taken for your headaches? Preventative:Toprol, Zoloft, Prednisone (no headaches with steroids) she took it for a week, gabapentin, Topamax, botox (x 2 with no help), amitriptyline, cyproheptadine  Abortive: Excedrin migraine, imitrex, maxalt    Daith earing: in place and did help in the beginning and then did not thus pt took it out    Headache trigger: Fatigue, Stress/Tension, chocolate and turkey    Aura: none    With botox has had a reduction of at least 7 migraine days with less abortive medication, less ER visits which correlates to headache diary    How often do the headaches occur - 9 to 10 a month    What time of the day do the headaches start:  morning she wakes up with a 8-9/10 and may have it at time in the afternoon    How long do the headaches last: all day - at onset she take maxalt and if that fails then toradol injection  If that fails she is to take cyprophetadine at bedtime  Where are they located: frontal, temporal and jaw pain    What is the intensity of pain: 8-10/10 a few times a months, usually in the morning 6/10    Describe your usual headache - Throbbing, pressure, achy, shooting, stabbing    Associated symptoms: photophobia, phonophobia, sensitivity to smell, nausea, vomiting, concentration problems, flushing of face, light-headed or dizzy, prefer to be alone and in a dark room, unable to work     Vit D, B12, homocysteine, TSH normal 12/17 12/27/17 MRI C spine    IMPRESSION:Mild spondylotic changes are similar to the previous study      12/27/17 MRI Brain  IMPRESSION:No acute infarction, intracranial hemorrhage or mass      The following portions of the patient's history were reviewed and updated as appropriate: allergies, current medications, past family history, past medical history, past social history, past surgical history and problem list          Objective:    Blood pressure 128/76, pulse 85, height 5' 3" (1 6 m), weight 72 3 kg (159 lb 8 oz)  Physical Exam   Constitutional: She appears well-developed  Eyes: Pupils are equal, round, and reactive to light  EOM are normal    Neck: Normal range of motion  Neck supple  Cardiovascular: Normal rate  Pulmonary/Chest: Effort normal    Abdominal: Soft  Musculoskeletal: Normal range of motion  Neurological: She has normal strength and normal reflexes  Skin: Skin is warm  Psychiatric: She has a normal mood and affect  Her speech is normal        Neurological Exam  Mental Status   Speech is normal  Language is fluent with no aphasia  Attention and concentration are normal     Cranial Nerves  CN II: Visual fields full to confrontation  CN III, IV, VI: Extraocular movements intact bilaterally  Pupils equal round and reactive to light bilaterally  CN V: Facial sensation is normal   CN VII: Full and symmetric facial movement  CN VIII: Hearing is normal   CN IX, X: Palate elevates symmetrically  Normal gag reflex  CN XI: Shoulder shrug strength is normal   CN XII: Tongue midline without atrophy or fasciculations  Motor   Strength is 5/5 throughout all four extremities  Sensory  Sensation is intact to light touch, pinprick, vibration and proprioception in all four extremities  Reflexes  Deep tendon reflexes are 2+ and symmetric in all four extremities with downgoing toes bilaterally  Coordination  Right: Finger-to-nose normal   Left: Finger-to-nose normal     Gait  Casual gait is normal including stance, stride, and arm swing  ROS:    Review of Systems   Constitutional: Negative  HENT: Negative  Eyes: Negative  Respiratory: Negative  Cardiovascular: Negative  Gastrointestinal: Positive for nausea  Endocrine: Negative  Genitourinary: Positive for frequency  Musculoskeletal: Negative  Skin: Negative  Allergic/Immunologic: Negative  Neurological: Positive for dizziness (Occasionally ), light-headedness (Occasionally) and headaches  Hematological: Negative  Psychiatric/Behavioral: Positive for sleep disturbance (Trouble falling and staying asleep )  The patient is nervous/anxious

## 2018-12-31 ENCOUNTER — TELEPHONE (OUTPATIENT)
Dept: NEUROLOGY | Facility: CLINIC | Age: 58
End: 2018-12-31

## 2018-12-31 ENCOUNTER — OFFICE VISIT (OUTPATIENT)
Dept: NEUROLOGY | Facility: CLINIC | Age: 58
End: 2018-12-31
Payer: COMMERCIAL

## 2018-12-31 VITALS
BODY MASS INDEX: 28.26 KG/M2 | DIASTOLIC BLOOD PRESSURE: 76 MMHG | WEIGHT: 159.5 LBS | HEIGHT: 63 IN | HEART RATE: 85 BPM | SYSTOLIC BLOOD PRESSURE: 128 MMHG

## 2018-12-31 DIAGNOSIS — G43.719 INTRACTABLE CHRONIC MIGRAINE WITHOUT AURA AND WITHOUT STATUS MIGRAINOSUS: ICD-10-CM

## 2018-12-31 DIAGNOSIS — G43.709 CHRONIC MIGRAINE WITHOUT AURA WITHOUT STATUS MIGRAINOSUS, NOT INTRACTABLE: ICD-10-CM

## 2018-12-31 PROCEDURE — 99214 OFFICE O/P EST MOD 30 MIN: CPT | Performed by: PSYCHIATRY & NEUROLOGY

## 2018-12-31 RX ORDER — TOPIRAMATE 50 MG/1
TABLET, FILM COATED ORAL
Qty: 120 TABLET | Refills: 3 | Status: SHIPPED | OUTPATIENT
Start: 2018-12-31 | End: 2019-06-07 | Stop reason: SDUPTHER

## 2018-12-31 RX ORDER — DIVALPROEX SODIUM 250 MG/1
TABLET, DELAYED RELEASE ORAL
Qty: 60 TABLET | Refills: 6 | Status: SHIPPED | OUTPATIENT
Start: 2018-12-31 | End: 2019-07-24 | Stop reason: SDUPTHER

## 2018-12-31 RX ORDER — CYPROHEPTADINE HYDROCHLORIDE 4 MG/1
4 TABLET ORAL DAILY
Qty: 30 TABLET | Refills: 0 | Status: SHIPPED | OUTPATIENT
Start: 2018-12-31 | End: 2019-08-20 | Stop reason: ALTCHOICE

## 2018-12-31 RX ORDER — RIZATRIPTAN BENZOATE 10 MG/1
10 TABLET, ORALLY DISINTEGRATING ORAL AS NEEDED
Qty: 9 TABLET | Refills: 1 | Status: SHIPPED | OUTPATIENT
Start: 2018-12-31 | End: 2019-08-20 | Stop reason: SDUPTHER

## 2018-12-31 NOTE — TELEPHONE ENCOUNTER
[facility] clarify topiramate 50mg script  i clarified scirpt as 50mg 1/2 tab bid for 7 days, then 50mg 1 tabs bid  she also states that emgality needs PA  [id number]  [id number]  [id number]  [id number]  emgality PA completed on CMM  I called pt regarding emgality com    She states that the dr gave her a savings card

## 2018-12-31 NOTE — PATIENT INSTRUCTIONS
Preventive therapy for migraine headaches:   - she is to continue taking Depakote 500mg at bedtime    -  She is to take olanzapine but as needed to break a headaches at night time only and that is the same for cyproheptadine for weather related or sinus headaches at night time    - given she has gained 20 pounds I will have her go back to taking Topamax 50 mg twice a day  - Emgality 120 mg two injection the first time and ten 30 days later she is to do one injection  Abortive therapy for migraine headaches:   - at the onset of a migraine headache she is to take Maxalt sublingual 10 mg with Compazine 10 mg  If in 2 hours her headache has not improved she has take Decadron 1 mg  If this does not improve her headache patient to call our office

## 2019-01-02 NOTE — TELEPHONE ENCOUNTER
[facility] called and states that emgality requires PA  Advised her of the below  Still awaiting for determination

## 2019-01-03 NOTE — TELEPHONE ENCOUNTER
emgality denied as they need documentation of number of headache days per month and that headaches are not caused by medication rebound, overutilization or due to lifestyle factors  I called [phone] and spoke to [facility]  Appeal completed over the phone  Sent back for review  [id number]  Should get a determination by tomorrow

## 2019-01-21 ENCOUNTER — TELEPHONE (OUTPATIENT)
Dept: NEUROLOGY | Facility: CLINIC | Age: 59
End: 2019-01-21

## 2019-06-07 DIAGNOSIS — G43.719 INTRACTABLE CHRONIC MIGRAINE WITHOUT AURA AND WITHOUT STATUS MIGRAINOSUS: ICD-10-CM

## 2019-06-07 DIAGNOSIS — G43.709 CHRONIC MIGRAINE WITHOUT AURA WITHOUT STATUS MIGRAINOSUS, NOT INTRACTABLE: ICD-10-CM

## 2019-06-07 RX ORDER — TOPIRAMATE 50 MG/1
TABLET, FILM COATED ORAL
Qty: 60 TABLET | Refills: 3 | Status: SHIPPED | OUTPATIENT
Start: 2019-06-07 | End: 2019-08-20 | Stop reason: SDUPTHER

## 2019-07-24 DIAGNOSIS — G43.719 INTRACTABLE CHRONIC MIGRAINE WITHOUT AURA AND WITHOUT STATUS MIGRAINOSUS: ICD-10-CM

## 2019-07-24 RX ORDER — DIVALPROEX SODIUM 250 MG/1
TABLET, DELAYED RELEASE ORAL
Qty: 60 TABLET | Refills: 6 | Status: SHIPPED | OUTPATIENT
Start: 2019-07-24 | End: 2019-08-20 | Stop reason: SDUPTHER

## 2019-08-05 ENCOUNTER — TELEPHONE (OUTPATIENT)
Dept: NEUROLOGY | Facility: CLINIC | Age: 59
End: 2019-08-05

## 2019-08-16 RX ORDER — METOPROLOL SUCCINATE 100 MG/1
1 TABLET, EXTENDED RELEASE ORAL DAILY
COMMUNITY
Start: 2019-08-10 | End: 2021-07-28

## 2019-08-16 RX ORDER — GALCANEZUMAB 120 MG/ML
1 INJECTION, SOLUTION SUBCUTANEOUS
Refills: 0 | COMMUNITY
Start: 2019-07-14 | End: 2019-09-18 | Stop reason: SDUPTHER

## 2019-08-16 NOTE — PROGRESS NOTES
[address] [facility]  PATIENT:  Alex Moore  MRN:  [id number]  :  1960  DATE OF SERVICE:  2019      Assessment/Plan:        Problem List Items Addressed This Visit        Cardiovascular and Mediastinum    Headache, chronic migraine without aura - Primary    Relevant Medications    EMGALITY 120 MG/ML SOAJ    metoprolol succinate (TOPROL-XL) 100 mg 24 hr tablet    rizatriptan (MAXALT-MLT) 10 MG disintegrating tablet    divalproex sodium (DEPAKOTE) 250 mg EC tablet    cyproheptadine (PERIACTIN) 4 mg tablet    topiramate (TOPAMAX) 50 MG tablet    prochlorperazine (COMPAZINE) 10 mg tablet           CHRONIC MIGRAINE HEADACHES WITHOUT AURA   Preventive therapy for migraine headaches:   - she is to continue taking Depakote 750 mg at bedtime    - Cyproheptadine 4mg at bedtime as needed on the day of her headache  - Topamax 50 mg twice a day  - Emgality 120 mg two injection the first time and ten 30 days later she is to do one injection  Abortive therapy for migraine headaches:   - at the onset of a migraine headache she is to take Maxalt sublingual 10 mg with Compazine 10 mg  If in 2 hours her headache has not improved she has take Decadron 1 mg  She may also take cyproheptadine 4 mg at bedtime that night to help with headaches  I AGAIN TOLD PATIENT THAT SHE NEEDS TO STOP TAKING EXCEDRIN MIGRAINE ON DAILY BASIS FOR HEADACHES         History of Present Illness: We had the pleasure of evaluating Alex Moore in neurological follow up today  As you know she is a 64year old right handed female  She is a  and is here today for evaluation of her headaches  Past medical history:  QTC:   - hypertension  - depression        Chronic Migraine headaches :  What medications do you take or have you taken for your headaches?    Preventative:  - vitamin-D 3, vitamin B12 1000 micro g, magnesium 250 mg, vitamin B2 100 mg  - Toprol 100 mg,   - Zoloft 100 mg, amitriptyline, Klonopin 0 5 mg daily, olanzapine 5 mg  - Prednisone (no headaches with steroids) she took it for a week,   - gabapentin, Topamax 50 mg twice a day, Depakote 500 mg q h s ,  - Botox (x 2 with no help), Emgality 120mg   - cyproheptadine (did not help)  Abortive:   - Excedrin migraine,  - Imitrex, Maxalt 10 mg  - Toradol 30/60 mg injection  - Compazine 10 mg    Daith earring: in place and did help in the beginning and then did not thus pt took it out    What is your current pain? 2/10    How often do the headaches occur ? 12 or more a month  Mild headache: none  Mod to severe: 12 ore more    Are you ever headache free? At least 2 a week       Aura/warning prior to headache onset? none    What time of the day do the headaches start? They still occur in am and now can occur in evening as well    How long do the headaches last: all day? at onset she take Maxalt and if that fails then Toradol injection  If that fails she is to take cyprohetadine at bedtime  Where are they located? frontal, temporal and jaw pain    What is the intensity of pain? 8-10/10 a few times a months, usually in the morning 6/10    Describe your usual headache? Throbbing, pressure, achy, shooting, stabbing    Associated symptoms:   -  nausea, vomiting  - photophobia, phonophobia, sensitivity to smell  -  concentration problems  -  light-headed or dizzy  -  prefer to be alone and in a dark room       Number of days missed per month because of headaches:  Work (or school) days: none  Social or Family activities: none    Headache trigger: Fatigue, Stress/Tension, chocolate and turkey  Have you used CBD or THC for your headaches and how often? No  Are you current pregnant or planning on getting pregnant? No    Have you ever had any Brain imaging? yes    I personally reviewed these images  Recent laboratory data was reviewed  Medications and allergies were reviewed    Reviewed old notes from physician seen in the past     12/27/17 MRI C spine    IMPRESSION:Mild spondylotic changes are similar to the previous study      12/27/17 MRI Brain  IMPRESSION:No acute infarction, intracranial hemorrhage or mass        Past Medical History:   Diagnosis Date    Anxiety     Depression     Disease of thyroid gland     History of transfusion     Hypertension     Migraine        Patient Active Problem List   Diagnosis    Cellulitis    Insomnia    Headache, chronic migraine without aura       Medications:      Current Outpatient Medications   Medication Sig Dispense Refill    Cholecalciferol (VITAMIN D3 PO) Take 1 tablet by mouth daily      clonazePAM (KlonoPIN) 0 5 mg tablet Take 0 5 mg by mouth daily    0    cyanocobalamin (VITAMIN B-12) 1,000 mcg tablet Take 1,000 mcg by mouth daily        divalproex sodium (DEPAKOTE) 250 mg EC tablet three at bedtime daily 90 tablet 6    EMGALITY 120 MG/ML SOAJ Inject 1 Dose under the skin every 30 (thirty) days  0    ketorolac (TORADOL) 30 mg/mL injection Inject 2 mL (60 mg total) into a muscle once for 1 dose (Patient taking differently: Inject 60 mg into a muscle as needed  ) 10 mL 6    Magnesium Oxide 250 MG TABS Take 1 tablet by mouth daily        metoprolol succinate (TOPROL-XL) 100 mg 24 hr tablet Take 1 tablet by mouth daily      omeprazole (PriLOSEC) 40 MG capsule Take 40 mg by mouth daily  0    Red Yeast Rice Extract (RED YEAST RICE PO) Take 1 tablet by mouth daily      Riboflavin (VITAMIN B-2) 100 MG TABS Take 1 tablet by mouth daily        rizatriptan (MAXALT-MLT) 10 MG disintegrating tablet Take 1 tablet (10 mg total) by mouth as needed for migraine Limit of 3/week or 9/month 12 tablet 5    sertraline (ZOLOFT) 100 mg tablet Take 2 tablets by mouth daily 1 TABLET BID       Syringe/Needle, Disp, (SYRINGE 3CC/45VA7-9/2") 22G X 1-1/2" 3 ML MISC by Does not apply route as needed (headaches) As needed for headaches 5 each 0    topiramate (TOPAMAX) 50 MG tablet Take 1 tablet by mouth twice a day 60 tablet 3    cyproheptadine (PERIACTIN) 4 mg tablet Take 1 tablet (4 mg total) by mouth daily 30 tablet 0    prochlorperazine (COMPAZINE) 10 mg tablet 1 tab at onset of migraine, can repeat in 8 hours, can take with triptan/NSAID 10 tablet 3     No current facility-administered medications for this visit  Allergies:       Allergies   Allergen Reactions    Ceftazidime      [facility]    Cefuroxime      [facility]  [facility]    Oxymetazoline      [facility]    Tetracycline Rash       Family History:     Family History   Problem Relation Age of Onset    Hypertension Mother     Bipolar disorder Family        Social History:     Social History     Socioeconomic History    Marital status: Single     Spouse name: Not on file    Number of children: Not on file    Years of education: Not on file    Highest education level: Not on file   Occupational History    Not on file   Social Needs    Financial resource strain: Not on file    Food insecurity:     Worry: Not on file     Inability: Not on file    Transportation needs:     Medical: Not on file     Non-medical: Not on file   Tobacco Use    Smoking status: Current Every Day Smoker     Packs/day: 0 25     Years: 10 00     Pack years: 2 50     Types: Cigarettes    Smokeless tobacco: Current User    Tobacco comment: Pt  may be interested in future   Substance and Sexual Activity    Alcohol use: No    Drug use: No    Sexual activity: Yes     Partners: Male     Birth control/protection: Post-menopausal   Lifestyle    Physical activity:     Days per week: Not on file     Minutes per session: Not on file    Stress: Not on file   Relationships    Social connections:     Talks on phone: Not on file     Gets together: Not on file     Attends Cheondoism service: Not on file     Active member of club or organization: Not on file     Attends meetings of clubs or organizations: Not on file     Relationship status: Not on file    Intimate partner violence:     Fear of current or ex partner: Not on file     Emotionally abused: Not on file     Physically abused: Not on file     Forced sexual activity: Not on file   Other Topics Concern    Not on file   Social History Narrative    Not on file         Objective:   Physical Exam:                                                                   Vitals:            /83 (BP Location: Left arm, Patient Position: Sitting, Cuff Size: Standard)   Pulse 70   Ht 5' 3" (1 6 m)   Wt 67 kg (147 lb 9 6 oz)   BMI 26 15 kg/m²   BP Readings from Last 3 Encounters:   08/20/19 132/83   12/31/18 128/76   11/20/18 118/80     Pulse Readings from Last 3 Encounters:   08/20/19 70   12/31/18 85   11/20/18 62            CONSTITUTIONAL: Well developed, well nourished, well groomed  No dysmorphic features  Eyes:  PERRLA, EOM normal      Neck:  Normal ROM, neck supple  HEENT:  Normocephalic atraumatic  No meningismus  Oropharynx is clear and moist  No oral mucosal lesions  Chest:  Respirations regular and unlabored  Cardiovascular:  Distal extremities warm without palpable edema or tenderness, no observed significant swelling  Musculoskeletal:  Full range of motion  Skin:  warm and dry   Psychiatric:  Normal behavior and appropriate affect        Neurological Examination:   Mental status/cognitive function: Orientated to time, place and person  Cranial Nerves: 2 to 12 intact  Motor Exam:    5/5 upper extremity  5/5 lower extremity  Sensory: grossly intact light touch in all extremities  Reflexes:   2/4 upper extremity  2/4 lower extremity  No clonus noted  Coordination: Finger to nose intact bilaterally, no tremor noted  Gait: steady casual  gait, able to do tandem gait, romberg negative  Review of Systems:   Review of Systems   Constitutional: Negative  HENT: Negative  Eyes: Positive for photophobia  Respiratory: Negative  Cardiovascular: Negative      Gastrointestinal: Positive for nausea  Endocrine: Negative  Genitourinary: Negative  Musculoskeletal: Positive for back pain  Skin: Negative  Allergic/Immunologic: Negative  Neurological: Positive for dizziness, light-headedness and headaches  Hematological: Negative  Psychiatric/Behavioral: Positive for sleep disturbance (Trouble falling and staying asleep )  The patient is nervous/anxious  I personally reviewed and updated the ROS that was entered by the medical assistant       I have spent 30 minutes with Patient  today in which greater than 50% of this time was spent in counseling/coordination of care regarding Diagnostic results, Prognosis, Risks and benefits of tx options, Intructions for management, Patient and family education, Importance of tx compliance, Risk factor reductions, Impressions and Plan of care as above        Author:  Jaylyn Bright MD 8/20/2019 2:05 PM

## 2019-08-20 ENCOUNTER — OFFICE VISIT (OUTPATIENT)
Dept: NEUROLOGY | Facility: CLINIC | Age: 59
End: 2019-08-20
Payer: COMMERCIAL

## 2019-08-20 VITALS
SYSTOLIC BLOOD PRESSURE: 132 MMHG | WEIGHT: 147.6 LBS | HEART RATE: 70 BPM | DIASTOLIC BLOOD PRESSURE: 83 MMHG | HEIGHT: 63 IN | BODY MASS INDEX: 26.15 KG/M2

## 2019-08-20 DIAGNOSIS — G43.709 CHRONIC MIGRAINE WITHOUT AURA WITHOUT STATUS MIGRAINOSUS, NOT INTRACTABLE: Primary | ICD-10-CM

## 2019-08-20 PROBLEM — G43.719 INTRACTABLE CHRONIC MIGRAINE WITHOUT AURA AND WITHOUT STATUS MIGRAINOSUS: Status: RESOLVED | Noted: 2018-02-13 | Resolved: 2019-08-20

## 2019-08-20 PROCEDURE — 99214 OFFICE O/P EST MOD 30 MIN: CPT | Performed by: PSYCHIATRY & NEUROLOGY

## 2019-08-20 RX ORDER — TOPIRAMATE 50 MG/1
TABLET, FILM COATED ORAL
Qty: 60 TABLET | Refills: 3 | Status: SHIPPED | OUTPATIENT
Start: 2019-08-20 | End: 2019-12-31

## 2019-08-20 RX ORDER — RIZATRIPTAN BENZOATE 10 MG/1
10 TABLET, ORALLY DISINTEGRATING ORAL AS NEEDED
Qty: 12 TABLET | Refills: 5 | Status: SHIPPED | OUTPATIENT
Start: 2019-08-20 | End: 2020-09-29

## 2019-08-20 RX ORDER — DIVALPROEX SODIUM 250 MG/1
TABLET, DELAYED RELEASE ORAL
Qty: 90 TABLET | Refills: 6 | Status: SHIPPED | OUTPATIENT
Start: 2019-08-20 | End: 2020-02-17

## 2019-08-20 RX ORDER — CYPROHEPTADINE HYDROCHLORIDE 4 MG/1
4 TABLET ORAL DAILY
Qty: 30 TABLET | Refills: 0 | Status: SHIPPED | OUTPATIENT
Start: 2019-08-20

## 2019-08-20 RX ORDER — PROCHLORPERAZINE MALEATE 10 MG
TABLET ORAL
Qty: 10 TABLET | Refills: 3 | Status: SHIPPED | OUTPATIENT
Start: 2019-08-20 | End: 2019-11-18 | Stop reason: SDUPTHER

## 2019-08-20 NOTE — PATIENT INSTRUCTIONS
CHRONIC MIGRAINE HEADACHES WITHOUT AURA   Preventive therapy for migraine headaches:   - she is to continue taking Depakote 750 mg at bedtime    - Cyproheptadine 4mg at bedtime as needed on the day of her headache  - Topamax 50 mg twice a day  - Emgality 120 mg two injection the first time and ten 30 days later she is to do one injection  Abortive therapy for migraine headaches:   - at the onset of a migraine headache she is to take Maxalt sublingual 10 mg with Compazine 10 mg  If in 2 hours her headache has not improved she has take Decadron 1 mg  She may also take cyproheptadine 4 mg at bedtime that night to help with headaches    I AGAIN TOLD PATIENT THAT SHE NEEDS TO STOP TAKING EXCEDRIN MIGRAINE ON DAILY BASIS FOR HEADACHES

## 2019-09-18 DIAGNOSIS — G43.709 CHRONIC MIGRAINE WITHOUT AURA WITHOUT STATUS MIGRAINOSUS, NOT INTRACTABLE: Primary | ICD-10-CM

## 2019-09-18 RX ORDER — GALCANEZUMAB 120 MG/ML
1 INJECTION, SOLUTION SUBCUTANEOUS
Qty: 1 PEN | Refills: 11 | Status: SHIPPED | OUTPATIENT
Start: 2019-09-18 | End: 2020-09-29

## 2019-09-18 NOTE — TELEPHONE ENCOUNTER
Pt called back and stated she was told this requires a PA  It looks like pt was approved for the loading dose, which she received, then was denied the maintenance dosing bc there was no documentation of a reduction in the number of headaches  I called pharmacist who stated she will help enroll her in the program to get free drug and will contact us if there are any issues

## 2019-09-18 NOTE — TELEPHONE ENCOUNTER
Patient called the script line asking for a refill  Was last seen 8/20/19 and is scheduled for a follow up 11/18/19  Last script was given 7/14/19  Please authorize if appropriate

## 2019-09-19 ENCOUNTER — TELEPHONE (OUTPATIENT)
Dept: NEUROLOGY | Facility: CLINIC | Age: 59
End: 2019-09-19

## 2019-09-19 DIAGNOSIS — G43.709 CHRONIC MIGRAINE WITHOUT AURA WITHOUT STATUS MIGRAINOSUS, NOT INTRACTABLE: ICD-10-CM

## 2019-09-19 RX ORDER — SYRINGE W-NEEDLE,DISPOSAB,3 ML 25GX5/8"
SYRINGE, EMPTY DISPOSABLE MISCELLANEOUS AS NEEDED
Qty: 5 EACH | Refills: 0 | Status: SHIPPED | OUTPATIENT
Start: 2019-09-19 | End: 2022-01-31

## 2019-09-19 NOTE — TELEPHONE ENCOUNTER
Pharm called to clarify how frequently the syringes are used  I made her aware this was to be used with her toradol and toradol is prn

## 2019-09-19 NOTE — TELEPHONE ENCOUNTER
Pt called and states that she takes ketorolac inj prn  States that she has enough meds but no syringes  Requesting rx for syringes be sent to [facility]           thanks

## 2019-11-08 ENCOUNTER — TELEPHONE (OUTPATIENT)
Dept: NEUROLOGY | Facility: CLINIC | Age: 59
End: 2019-11-08

## 2019-11-08 NOTE — TELEPHONE ENCOUNTER
LMOM seeing if patient would like to move apt 11/18/19 up to this Monday 11/11/19 at 11:30 am as Cayla Chu had a cancellation

## 2019-11-11 NOTE — TELEPHONE ENCOUNTER
Attempted to reach patient a second time to see if she would like to come in tomorrow, as Yulissa Coburn has some openings  LMOM for patient to call back

## 2019-11-13 NOTE — PROGRESS NOTES
[facility]  PATIENT:  Maira Ivy  MRN:  [id number]  :  1960  DATE OF SERVICE:  2019      Assessment/Plan:     Headache, chronic migraine without aura   Preventive therapy for migraine headaches:   - she is to continue taking Depakote 750 mg at bedtime    - Cyproheptadine 4mg at bedtime as needed on the day of her headache  - Topamax 50 mg twice a day  - Emgality 120 mg every 30 days  Will wean Zoloft to start Venlafaxine  Zoloft 100 mg am and 50 mg bedtime for 5 days, 50 mg twice a day for 5 days--then 50 bedtime for 5 days  When Zoloft at 50 mg twice a day Venlafaxine 37 5 mg in am for 14 days then increase to 2 tabs    Abortive therapy for migraine headaches:   - at the onset of a migraine headache she is to take Maxalt sublingual 10 mg with Compazine 10 mg  If in 2 hours her headache has not improved she has take Decadron 1 mg  She may also take cyproheptadine 4 mg at bedtime that night to help with headaches  Generalized anxiety disorder  We will transition from Zoloft to Venlafaxine to see if can help with both her anxiety and migraines  Wean off zoloft and start Venlafaxine  Problem List Items Addressed This Visit        Cardiovascular and Mediastinum    Headache, chronic migraine without aura      Preventive therapy for migraine headaches:   - she is to continue taking Depakote 750 mg at bedtime    - Cyproheptadine 4mg at bedtime as needed on the day of her headache  - Topamax 50 mg twice a day  - Emgality 120 mg every 30 days  Will wean Zoloft to start Venlafaxine  Zoloft 100 mg am and 50 mg bedtime for 5 days, 50 mg twice a day for 5 days--then 50 bedtime for 5 days  When Zoloft at 50 mg twice a day Venlafaxine 37 5 mg in am for 14 days then increase to 2 tabs    Abortive therapy for migraine headaches:   - at the onset of a migraine headache she is to take Maxalt sublingual 10 mg with Compazine 10 mg      If in 2 hours her headache has not improved she has take Decadron 1 mg  She may also take cyproheptadine 4 mg at bedtime that night to help with headaches  Relevant Medications    prochlorperazine (COMPAZINE) 10 mg tablet    venlafaxine (EFFEXOR-XR) 37 5 mg 24 hr capsule    venlafaxine (EFFEXOR-XR) 75 mg 24 hr capsule    Other Relevant Orders    ECG 12 lead       Other    Generalized anxiety disorder - Primary     We will transition from Zoloft to Venlafaxine to see if can help with both her anxiety and migraines  Wean off zoloft and start Venlafaxine  Relevant Medications    prochlorperazine (COMPAZINE) 10 mg tablet    venlafaxine (EFFEXOR-XR) 37 5 mg 24 hr capsule    venlafaxine (EFFEXOR-XR) 75 mg 24 hr capsule              History of Present Illness: We had the pleasure of evaluating Artemio Bueno in neurological follow up  today for headaches  As you know,  she is a 64 y o  right handedfemale  She is a  and is here today for evaluation of her headaches          Past medical history:  QTC: will order today  - hypertension  - depression         Chronic Migraine headaches :  What medications do you take or have you taken for your headaches? Preventative:  - vitamin-D 3, vitamin B12 1000 micro g, magnesium 250 mg, vitamin B2 100 mg  - Toprol 100 mg,   - Zoloft 100 mg, amitriptyline, Klonopin 0 5 mg daily, olanzapine 5 mg, Venlafaxine  - Prednisone (no headaches with steroids) she took it for a week,   - gabapentin, Topamax 50 mg twice a day, Depakote 500 mg q h s ,  - Botox (x 2 with no help), Emgality 120mg   - cyproheptadine (did not help)  Abortive:   - Excedrin migraine,  - Imitrex, Maxalt 10 mg  - Toradol 30/60 mg injection  - Compazine 10 mg  - Decadron     Daith earring: in place and did help in the beginning and then did not thus pt took it out     What is your current pain? 0/10     How often do the headaches occur ? 12 or more a month  Mild headache: none  Mod to severe: 12 or more     Are you ever headache free? At least 2 a week    Aura/warning prior to headache onset? none     What time of the day do the headaches start? They still occur in am and now can occur in evening as well     How long do the headaches last: all day? at onset she take Maxalt and if that fails then Toradol injection  If that fails she is to take cyprohetadine at bedtime       Where are they located? frontal, temporal and jaw pain     What is the intensity of pain? 8-10/10 a few times a months, usually in the morning 6/10     Describe your usual headache? Throbbing, pressure, achy, shooting, stabbing     Associated symptoms:   -  nausea, vomiting  - photophobia, phonophobia, sensitivity to smell  -  concentration problems  -  light-headed or dizzy  -  prefer to be alone and in a dark room        Number of days missed per month because of headaches:  Work (or school) days: none  Social or Family activities: none     Headache trigger: Fatigue, Stress/Tension, chocolate and turkey  Have you used CBD or THC for your headaches and how often? No  Are you current pregnant or planning on getting pregnant? No     Have you ever had any Brain imaging? yes    I personally reviewed these images  Recent laboratory data was reviewed  Medications and allergies were reviewed  Reviewed old notes from physician seen in the past      12/27/17 MRI C spine    IMPRESSION:Mild spondylotic changes are similar to the previous study      12/27/17 MRI Brain  IMPRESSION:No acute infarction, intracranial hemorrhage or mass      - Reviewed old notes from physician seen in the past- see above HPI for summary of previous encounters       Past Medical History:   Diagnosis Date    Anxiety     Depression     Disease of thyroid gland     History of transfusion     Hypertension     Migraine        Patient Active Problem List   Diagnosis    Cellulitis    Insomnia    Headache, chronic migraine without aura    Generalized anxiety disorder       Medications:      Current Outpatient Medications   Medication Sig Dispense Refill    Cholecalciferol (VITAMIN D3 PO) Take 1 tablet by mouth daily      clonazePAM (KlonoPIN) 0 5 mg tablet Take 0 5 mg by mouth daily    0    cyanocobalamin (VITAMIN B-12) 1,000 mcg tablet Take 1,000 mcg by mouth daily        cyproheptadine (PERIACTIN) 4 mg tablet Take 1 tablet (4 mg total) by mouth daily (Patient taking differently: Take 4 mg by mouth as needed ) 30 tablet 0    divalproex sodium (DEPAKOTE) 250 mg EC tablet three at bedtime daily 90 tablet 6    EMGALITY 120 MG/ML SOAJ Inject 1 Dose under the skin every 30 (thirty) days 1 pen 11    ketorolac (TORADOL) 30 mg/mL injection Inject 2 mL (60 mg total) into a muscle once for 1 dose (Patient taking differently: Inject 60 mg into a muscle as needed  ) 10 mL 6    Magnesium Oxide 250 MG TABS Take 1 tablet by mouth daily        metoprolol succinate (TOPROL-XL) 100 mg 24 hr tablet Take 1 tablet by mouth daily      omeprazole (PriLOSEC) 40 MG capsule Take 40 mg by mouth daily  0    prochlorperazine (COMPAZINE) 10 mg tablet 1 tab at onset of migraine, can repeat in 8 hours, can take with triptan/NSAID 10 tablet 3    Red Yeast Rice Extract (RED YEAST RICE PO) Take 1 tablet by mouth daily      Riboflavin (VITAMIN B-2) 100 MG TABS Take 1 tablet by mouth daily        rizatriptan (MAXALT-MLT) 10 MG disintegrating tablet Take 1 tablet (10 mg total) by mouth as needed for migraine Limit of 3/week or 9/month 12 tablet 5    sertraline (ZOLOFT) 100 mg tablet Take 2 tablets by mouth daily 1 TABLET BID       Syringe/Needle, Disp, (SYRINGE 3CC/19DO4-7/2") 22G X 1-1/2" 3 ML MISC by Does not apply route as needed (headaches) As needed for headaches 5 each 0    topiramate (TOPAMAX) 50 MG tablet Take 1 tablet by mouth twice a day 60 tablet 3    triamcinolone (KENALOG) 0 1 % ointment Apply 1 application topically 2 (two) times a day  0    venlafaxine (EFFEXOR-XR) 37 5 mg 24 hr capsule Take 1 capsule (37 5 mg total) by mouth daily 14 capsule 0    venlafaxine (EFFEXOR-XR) 75 mg 24 hr capsule Take 1 capsule (75 mg total) by mouth daily 30 capsule 6     No current facility-administered medications for this visit  Allergies:       Allergies   Allergen Reactions    Ceftazidime      [facility]    Cefuroxime      [facility]  [facility]    Oxymetazoline      [facility]    Tetracycline Rash       Family History:     Family History   Problem Relation Age of Onset    Hypertension Mother     Bipolar disorder Family        Social History:     Social History     Socioeconomic History    Marital status: Single     Spouse name: Not on file    Number of children: Not on file    Years of education: Not on file    Highest education level: Not on file   Occupational History    Not on file   Social Needs    Financial resource strain: Not on file    Food insecurity:     Worry: Not on file     Inability: Not on file    Transportation needs:     Medical: Not on file     Non-medical: Not on file   Tobacco Use    Smoking status: Current Every Day Smoker     Packs/day: 0 25     Years: 10 00     Pack years: 2 50     Types: Cigarettes    Smokeless tobacco: Current User    Tobacco comment: Pt  may be interested in future   Substance and Sexual Activity    Alcohol use: No    Drug use: No    Sexual activity: Yes     Partners: Male     Birth control/protection: Post-menopausal   Lifestyle    Physical activity:     Days per week: Not on file     Minutes per session: Not on file    Stress: Not on file   Relationships    Social connections:     Talks on phone: Not on file     Gets together: Not on file     Attends Denominational service: Not on file     Active member of club or organization: Not on file     Attends meetings of clubs or organizations: Not on file     Relationship status: Not on file    Intimate partner violence:     Fear of current or ex partner: Not on file     Emotionally abused: Not on file     Physically abused: Not on file     Forced sexual activity: Not on file   Other Topics Concern    Not on file   Social History Narrative    Not on file         Objective:   Physical Exam:                                                                   Vitals:            /96 (BP Location: Left arm, Patient Position: Sitting, Cuff Size: Adult)   Ht 5' 3" (1 6 m)   Wt 66 2 kg (146 lb)   BMI 25 86 kg/m²   BP Readings from Last 3 Encounters:   11/18/19 136/96   08/20/19 132/83   12/31/18 128/76     Pulse Readings from Last 3 Encounters:   08/20/19 70   12/31/18 85   11/20/18 62                CONSTITUTIONAL: Well developed, well nourished, well groomed  No dysmorphic features  Eyes:  PERRLA, EOM normal      Neck:  Normal ROM, neck supple  HEENT:  Normocephalic atraumatic  No meningismus  Oropharynx is clear and moist  No oral mucosal lesions  Chest:  Respirations regular and unlabored  Cardiovascular:  Distal extremities warm without palpable edema or tenderness, no observed significant swelling  Musculoskeletal:  Full range of motion  (see below under neurologic exam for evaluation of motor function and gait)   Skin:  warm and dry   Psychiatric:  Normal behavior and appropriate affect        SKULL AND SPINE  ROM: Full range of motion  Tenderness: No focal tenderness    MENTAL STATUS  Orientation: Alert and oriented x 3  Fund of knowledge: Intact  CRANIAL NERVES  II: PERRL  Visual fields full  III, IV, VI: Extraocular movements intact  No nystagmus  V: Facial sensation normal V1-V3  VII: Facial movements normal and symmetric  VIII: Intact hearing bilaterally  IX, X: Palate elevation normal and symmetric  XI: Intact trapezius, SCM strength  XII: Tongue protrudes to the midline    MOTOR (Upper and lower extremities)   Bulk/tone/abnormal movement: Normal muscle bulk and tone  Strength: Strength 5/5 throughout  COORDINATION   Station/Gait: Normal baseline gait  Reflexes:  deep tendon reflexes are 2+/4 throughout, flexor response bilaterally       Review of Systems:   Review of Systems   Constitutional: Negative  HENT: Negative  Eyes: Negative  Respiratory: Negative  Cardiovascular: Negative  Gastrointestinal: Negative  Endocrine: Negative  Genitourinary: Negative  Musculoskeletal: Negative  Skin: Negative  Allergic/Immunologic: Negative  Neurological: Positive for light-headedness and headaches (10-12 month )  Hematological: Negative  Psychiatric/Behavioral: The patient is nervous/anxious  I personally reviewed and updated the ROS that was entered by the medical assistant       I have spent 25 minutes with Patient  today in which greater than 50% of this time was spent in counseling/coordination of care regarding Diagnostic results, Prognosis, Risks and benefits of tx options, Intructions for management, Patient and family education, Importance of tx compliance and Risk factor reductions        Author:  Mirian Yun PA-C 11/18/2019 12:34 PM

## 2019-11-18 ENCOUNTER — CLINICAL SUPPORT (OUTPATIENT)
Dept: URGENT CARE | Facility: CLINIC | Age: 59
End: 2019-11-18

## 2019-11-18 ENCOUNTER — OFFICE VISIT (OUTPATIENT)
Dept: NEUROLOGY | Facility: CLINIC | Age: 59
End: 2019-11-18
Payer: COMMERCIAL

## 2019-11-18 VITALS
BODY MASS INDEX: 25.87 KG/M2 | DIASTOLIC BLOOD PRESSURE: 96 MMHG | SYSTOLIC BLOOD PRESSURE: 136 MMHG | WEIGHT: 146 LBS | HEIGHT: 63 IN

## 2019-11-18 DIAGNOSIS — F41.1 GENERALIZED ANXIETY DISORDER: Primary | ICD-10-CM

## 2019-11-18 DIAGNOSIS — G43.709 CHRONIC MIGRAINE WITHOUT AURA WITHOUT STATUS MIGRAINOSUS, NOT INTRACTABLE: ICD-10-CM

## 2019-11-18 DIAGNOSIS — G43.701 CHRONIC MIGRAINE WITHOUT AURA WITH STATUS MIGRAINOSUS, NOT INTRACTABLE: Primary | ICD-10-CM

## 2019-11-18 PROCEDURE — 99214 OFFICE O/P EST MOD 30 MIN: CPT | Performed by: PHYSICIAN ASSISTANT

## 2019-11-18 RX ORDER — KETOROLAC TROMETHAMINE 30 MG/ML
INJECTION, SOLUTION INTRAMUSCULAR; INTRAVENOUS
Refills: 0 | COMMUNITY
Start: 2019-08-28 | End: 2019-11-18 | Stop reason: SDUPTHER

## 2019-11-18 RX ORDER — PROCHLORPERAZINE MALEATE 10 MG
TABLET ORAL
Qty: 10 TABLET | Refills: 3 | Status: SHIPPED | OUTPATIENT
Start: 2019-11-18 | End: 2020-12-01 | Stop reason: SDUPTHER

## 2019-11-18 RX ORDER — VENLAFAXINE HYDROCHLORIDE 37.5 MG/1
37.5 CAPSULE, EXTENDED RELEASE ORAL DAILY
Qty: 14 CAPSULE | Refills: 0 | Status: SHIPPED | OUTPATIENT
Start: 2019-11-18 | End: 2020-04-21

## 2019-11-18 RX ORDER — VENLAFAXINE HYDROCHLORIDE 75 MG/1
75 CAPSULE, EXTENDED RELEASE ORAL DAILY
Qty: 30 CAPSULE | Refills: 6 | Status: SHIPPED | OUTPATIENT
Start: 2019-11-18 | End: 2020-04-21 | Stop reason: SDUPTHER

## 2019-11-18 NOTE — ASSESSMENT & PLAN NOTE
We will transition from Zoloft to Venlafaxine to see if can help with both her anxiety and migraines  Wean off zoloft and start Venlafaxine

## 2019-11-18 NOTE — ASSESSMENT & PLAN NOTE
Preventive therapy for migraine headaches:   - she is to continue taking Depakote 750 mg at bedtime    - Cyproheptadine 4mg at bedtime as needed on the day of her headache  - Topamax 50 mg twice a day  - Emgality 120 mg every 30 days  Will wean Zoloft to start Venlafaxine  Zoloft 100 mg am and 50 mg bedtime for 5 days, 50 mg twice a day for 5 days--then 50 bedtime for 5 days  When Zoloft at 50 mg twice a day Venlafaxine 37 5 mg in am for 14 days then increase to 2 tabs    Abortive therapy for migraine headaches:   - at the onset of a migraine headache she is to take Maxalt sublingual 10 mg with Compazine 10 mg  If in 2 hours her headache has not improved she has take Decadron 1 mg  She may also take cyproheptadine 4 mg at bedtime that night to help with headaches

## 2019-11-18 NOTE — PATIENT INSTRUCTIONS
CHRONIC MIGRAINE HEADACHES WITHOUT AURA   Preventive therapy for migraine headaches:   - she is to continue taking Depakote 750 mg at bedtime    - Cyproheptadine 4mg at bedtime as needed on the day of her headache  - Topamax 50 mg twice a day  - Emgality 120 mg every 30 days  Will wean Zoloft to start Venlafaxine  Zoloft 100 mg am and 50 mg bedtime for 5 days, 50 mg twice a day for 5 days--then 50 bedtime for 5 days  When Zoloft at 50 mg twice a day Venlafaxine 37 5 mg in am for 14 days then increase to 2 tabs    Abortive therapy for migraine headaches:   - at the onset of a migraine headache she is to take Maxalt sublingual 10 mg with Compazine 10 mg  If in 2 hours her headache has not improved she has take Decadron 1 mg  She may also take cyproheptadine 4 mg at bedtime that night to help with headaches

## 2019-11-28 DIAGNOSIS — G43.709 CHRONIC MIGRAINE WITHOUT AURA WITHOUT STATUS MIGRAINOSUS, NOT INTRACTABLE: ICD-10-CM

## 2019-11-29 RX ORDER — VENLAFAXINE HYDROCHLORIDE 37.5 MG/1
CAPSULE, EXTENDED RELEASE ORAL
Qty: 14 CAPSULE | Refills: 0 | OUTPATIENT
Start: 2019-11-29

## 2019-12-31 DIAGNOSIS — G43.709 CHRONIC MIGRAINE WITHOUT AURA WITHOUT STATUS MIGRAINOSUS, NOT INTRACTABLE: ICD-10-CM

## 2019-12-31 RX ORDER — TOPIRAMATE 50 MG/1
TABLET, FILM COATED ORAL
Qty: 60 TABLET | Refills: 6 | Status: SHIPPED | OUTPATIENT
Start: 2019-12-31 | End: 2020-07-22

## 2020-02-15 DIAGNOSIS — G43.709 CHRONIC MIGRAINE WITHOUT AURA WITHOUT STATUS MIGRAINOSUS, NOT INTRACTABLE: ICD-10-CM

## 2020-02-17 RX ORDER — DIVALPROEX SODIUM 250 MG/1
TABLET, DELAYED RELEASE ORAL
Qty: 60 TABLET | Refills: 2 | Status: SHIPPED | OUTPATIENT
Start: 2020-02-17 | End: 2020-03-13

## 2020-03-13 DIAGNOSIS — G43.709 CHRONIC MIGRAINE WITHOUT AURA WITHOUT STATUS MIGRAINOSUS, NOT INTRACTABLE: ICD-10-CM

## 2020-03-13 RX ORDER — DIVALPROEX SODIUM 250 MG/1
TABLET, DELAYED RELEASE ORAL
Qty: 60 TABLET | Refills: 2 | Status: SHIPPED | OUTPATIENT
Start: 2020-03-13 | End: 2020-04-21 | Stop reason: SDUPTHER

## 2020-04-03 ENCOUNTER — TELEPHONE (OUTPATIENT)
Dept: NEUROLOGY | Facility: CLINIC | Age: 60
End: 2020-04-03

## 2020-04-21 ENCOUNTER — TELEMEDICINE (OUTPATIENT)
Dept: NEUROLOGY | Facility: CLINIC | Age: 60
End: 2020-04-21
Payer: COMMERCIAL

## 2020-04-21 DIAGNOSIS — G43.709 CHRONIC MIGRAINE WITHOUT AURA WITHOUT STATUS MIGRAINOSUS, NOT INTRACTABLE: ICD-10-CM

## 2020-04-21 PROCEDURE — 99213 OFFICE O/P EST LOW 20 MIN: CPT | Performed by: PHYSICIAN ASSISTANT

## 2020-04-21 RX ORDER — DIVALPROEX SODIUM 250 MG/1
750 TABLET, DELAYED RELEASE ORAL
Qty: 90 TABLET | Refills: 2 | Status: SHIPPED | OUTPATIENT
Start: 2020-04-21 | End: 2020-08-26 | Stop reason: SDUPTHER

## 2020-04-21 RX ORDER — VENLAFAXINE HYDROCHLORIDE 150 MG/1
150 CAPSULE, EXTENDED RELEASE ORAL DAILY
Qty: 30 CAPSULE | Refills: 3 | Status: SHIPPED | OUTPATIENT
Start: 2020-04-21 | End: 2020-08-14

## 2020-04-22 ENCOUNTER — TELEPHONE (OUTPATIENT)
Dept: NEUROLOGY | Facility: CLINIC | Age: 60
End: 2020-04-22

## 2020-06-08 ENCOUNTER — TELEPHONE (OUTPATIENT)
Dept: NEUROLOGY | Facility: CLINIC | Age: 60
End: 2020-06-08

## 2020-06-15 DIAGNOSIS — G43.709 CHRONIC MIGRAINE WITHOUT AURA WITHOUT STATUS MIGRAINOSUS, NOT INTRACTABLE: ICD-10-CM

## 2020-06-15 RX ORDER — VENLAFAXINE HYDROCHLORIDE 75 MG/1
CAPSULE, EXTENDED RELEASE ORAL
Qty: 30 CAPSULE | Refills: 6 | Status: SHIPPED | OUTPATIENT
Start: 2020-06-15 | End: 2022-01-31

## 2020-07-22 DIAGNOSIS — G43.709 CHRONIC MIGRAINE WITHOUT AURA WITHOUT STATUS MIGRAINOSUS, NOT INTRACTABLE: ICD-10-CM

## 2020-07-22 RX ORDER — TOPIRAMATE 50 MG/1
TABLET, FILM COATED ORAL
Qty: 60 TABLET | Refills: 6 | Status: SHIPPED | OUTPATIENT
Start: 2020-07-22 | End: 2020-08-26 | Stop reason: SDUPTHER

## 2020-08-14 DIAGNOSIS — G43.709 CHRONIC MIGRAINE WITHOUT AURA WITHOUT STATUS MIGRAINOSUS, NOT INTRACTABLE: ICD-10-CM

## 2020-08-14 RX ORDER — VENLAFAXINE HYDROCHLORIDE 150 MG/1
CAPSULE, EXTENDED RELEASE ORAL
Qty: 30 CAPSULE | Refills: 3 | Status: SHIPPED | OUTPATIENT
Start: 2020-08-14 | End: 2020-08-26 | Stop reason: ALTCHOICE

## 2020-08-26 ENCOUNTER — OFFICE VISIT (OUTPATIENT)
Dept: NEUROLOGY | Facility: CLINIC | Age: 60
End: 2020-08-26
Payer: COMMERCIAL

## 2020-08-26 VITALS
DIASTOLIC BLOOD PRESSURE: 90 MMHG | BODY MASS INDEX: 24.96 KG/M2 | WEIGHT: 140.9 LBS | HEART RATE: 67 BPM | HEIGHT: 63 IN | TEMPERATURE: 96.5 F | SYSTOLIC BLOOD PRESSURE: 140 MMHG

## 2020-08-26 DIAGNOSIS — G43.709 CHRONIC MIGRAINE WITHOUT AURA WITHOUT STATUS MIGRAINOSUS, NOT INTRACTABLE: Primary | ICD-10-CM

## 2020-08-26 DIAGNOSIS — K21.9 GASTROESOPHAGEAL REFLUX DISEASE, ESOPHAGITIS PRESENCE NOT SPECIFIED: ICD-10-CM

## 2020-08-26 DIAGNOSIS — F41.1 GENERALIZED ANXIETY DISORDER: ICD-10-CM

## 2020-08-26 DIAGNOSIS — I10 ESSENTIAL HYPERTENSION: ICD-10-CM

## 2020-08-26 PROBLEM — K21.00 REFLUX ESOPHAGITIS: Status: RESOLVED | Noted: 2020-08-26 | Resolved: 2020-08-26

## 2020-08-26 PROBLEM — K21.00 REFLUX ESOPHAGITIS: Status: ACTIVE | Noted: 2020-08-26

## 2020-08-26 PROCEDURE — 99214 OFFICE O/P EST MOD 30 MIN: CPT | Performed by: PSYCHIATRY & NEUROLOGY

## 2020-08-26 RX ORDER — DIVALPROEX SODIUM 250 MG/1
TABLET, DELAYED RELEASE ORAL
Qty: 120 TABLET | Refills: 2 | Status: SHIPPED | OUTPATIENT
Start: 2020-08-26 | End: 2020-11-22

## 2020-08-26 RX ORDER — TOPIRAMATE 50 MG/1
TABLET, FILM COATED ORAL
Qty: 90 TABLET | Refills: 6 | Status: SHIPPED | OUTPATIENT
Start: 2020-08-26 | End: 2021-08-23

## 2020-08-26 RX ORDER — HYDROXYZINE HYDROCHLORIDE 25 MG/1
TABLET, FILM COATED ORAL
Qty: 30 TABLET | Refills: 1 | Status: SHIPPED | OUTPATIENT
Start: 2020-08-26

## 2020-08-26 RX ORDER — DEXAMETHASONE 1 MG
TABLET ORAL
Qty: 10 TABLET | Refills: 0 | Status: SHIPPED | OUTPATIENT
Start: 2020-08-26 | End: 2020-12-03 | Stop reason: SDUPTHER

## 2020-08-26 NOTE — PATIENT INSTRUCTIONS
Hypertension:  - On metoprolol 100 mg daily    Generalized anxiety:  - Venlafaxine 150 mg in a m  daily  If anxiety does not improve consider adding Seroquel at bedtime  And stopping the hydroxyzine  - Hydroxyzine 25 mg at bedtime nightly for 2 months to see if this helps with this internal tremor that she is experiencing  If no improvement consider referral to [facility]   -Klonopin 0 5 mg daily    Chronic migraine headache without aura:    Preventive therapy:  - continue magnesium 50 mg and vitamin B2 100 mg 1 tab daily  - will have patient take Depakote 500 mg twice a day-if headaches and anxiety do not improve consider changing to Lamictal   - increase Topamax to 50 mg in the morning and 100 mg at bedtime  - venlafaxine 150 mg in a m  Daily (increased in April of 2020)  - Emgality 120 mg two injection the first time and ten 30 days later she is to do one injection  Abortive therapy for migraine headaches:   - at the onset of a migraine headache she is to take Maxalt sublingual 10 mg with Compazine 10 mg   If in 2 hours her headache has not improved she has take Decadron 1 mg  She may also take cyproheptadine 4 mg at bedtime that night to help with headaches  - for her mild headaches pain intensity between 1 and 4 she is taking Compazine 10 mg which does seem to abort the milder headache

## 2020-08-26 NOTE — PROGRESS NOTES
[facility]  PATIENT:  Kristen Vanessa  MRN:  [id number]  :  1960  DATE OF SERVICE:  2020      Assessment/Plan:      Problem List Items Addressed This Visit        Digestive    GERD (gastroesophageal reflux disease)       Cardiovascular and Mediastinum    Headache, chronic migraine without aura - Primary    Relevant Medications    topiramate (TOPAMAX) 50 MG tablet    divalproex sodium (DEPAKOTE) 250 mg EC tablet    Essential hypertension       Other    Generalized anxiety disorder    Relevant Medications    hydrOXYzine HCL (ATARAX) 25 mg tablet          Hypertension:  - On metoprolol 100 mg daily    Generalized anxiety:  - Venlafaxine 150 mg in a m  daily  If anxiety does not improve consider adding Seroquel at bedtime  And stopping the hydroxyzine  - Hydroxyzine 25 mg at bedtime nightly for 2 months to see if this helps with this internal tremor that she is experiencing  If no improvement consider referral to [facility]   -Klonopin 0 5 mg daily    Chronic migraine headache without aura:    Preventive therapy:  - continue magnesium 50 mg and vitamin B2 100 mg 1 tab daily  - will have patient take Depakote 500 mg twice a day  - increase Topamax to 50 mg in the morning and 100 mg at bedtime  - venlafaxine 150 mg in a m  Daily (increased in 2020)  - Emgality 120 mg two injection the first time and ten 30 days later she is to do one injection  Abortive therapy for migraine headaches:   - at the onset of a migraine headache she is to take Maxalt sublingual 10 mg with Compazine 10 mg   If in 2 hours her headache has not improved she has take Decadron 1 mg  She may also take cyproheptadine 4 mg at bedtime that night to help with headaches  - for her mild headaches pain intensity between 1 and 4 she is taking Compazine 10 mg which does seem to abort the milder headache  History of Present Illness:    We had the pleasure of evaluating Kristen Vanessa in neurological follow up today  As you know she is a 64year old right handed female  She is a  and is here today for evaluation of her headaches          Past medical history:  QTC: 411/25/2019 - 408   -GERD  - hypertension  - depression /anxiety     Depression/generalized anxiety:   Since last seen patient states that due to COVID her anxiety is much worse  COVID has also exacerbated her headaches  Internal tremor:   Pt states that after her flu vaccination in October ob 2019, she developed this internal tremor  Describes it as a sensation of internal shaking but it time has noted that her arms and whole body shakes as well  Onset: after the flu vaccination  Occurs: It tends to occurs 3-4 times a week  Lasts: all day or at time will go into the next day  Trigger: none  She was given hydroxyzine 25mg  by her PCP and per pt it did not help  She took it for few days intermittently  Chronic Migraine headaches :  What medications do you take or have you taken for your headaches/mood/BP? Preventative:  - vitamin-D 3, vitamin B12 1000 micro g, magnesium 250 mg, vitamin B2 100 mg, magnesium sulfate 2 g IV,  - Daith earring: in place and did help in the beginning and then did not thus pt took it out  - Botox (x 2 with no help), Emgality 120mg   - Toprol 100 mg,   - Zoloft 100 mg, amitriptyline 10 mg,  venlafaxine 150 mg  - Klonopin 0 5 mg  - olanzapine 5 mg, risperidone 1 mg,  - gabapentin, Topamax 150 mg , Depakote 500 mg q h s , Lamictal 100 mg q h s  (2018)  - cyproheptadine 4 mg  (did not help), Benadryl 50 mg, hydroxyzine 25 mg,  - Robaxin 1 g,  Abortive:   - Excedrin migraine,  - Imitrex 100 mg p o ,, Maxalt 10 mg sublingual,  - DHE 1 mg (IV infusion -2018)  - Toradol 30/60 mg injection  - Compazine 10 mg, Zofran 4 mg,   - Prednisone (no headaches with steroids) she took it for a week,        What is your current pain? 2/10     How often do the headaches occur ? 12 or more a month but were getting better before the pandemic but then they got much worse after the stress of the pandemic  Mild headache: none  Mod to severe: 12 ore more     Are you ever headache free? At least 2 a week       Aura/warning prior to headache onset? none     What time of the day do the headaches start? They still occur in am and now can occur in evening as well     How long do the headaches last: all day? Mild headaches:  Few hours if she can take Compazine early on and can get rid of it  Moderate to severe headache:  Few hours with Maxalt as it works 80% of the time  If this fails may last half of the day as she has to take the Decadron to aborted        Where are they located? frontal, temporal and jaw pain     What is the intensity of pain? 8-10/10 a few times a months, usually in the morning 6/10     Describe your usual headache? Throbbing, pressure, achy, shooting, stabbing     Associated symptoms:   -  nausea, vomiting  - photophobia, phonophobia, sensitivity to smell  -  concentration problems  -  light-headed or dizzy  -  prefer to be alone and in a dark room        Number of days missed per month because of headaches:  Work (or school) days: none  Social or Family activities: none     Headache trigger: Fatigue, Stress/Tension, chocolate and turkey  Have you used CBD or THC for your headaches and how often? No  Are you current pregnant or planning on getting pregnant? No     Have you ever had any Brain imaging? yes    I personally reviewed these images  Recent laboratory data was reviewed  Medications and allergies were reviewed    Reviewed old notes from physician seen in the past      12/27/17 MRI C spine    IMPRESSION:Mild spondylotic changes are similar to the previous study      12/27/17 MRI Brain  IMPRESSION:No acute infarction, intracranial hemorrhage or mass        Past Medical History:   Diagnosis Date    Anxiety     Depression     Disease of thyroid gland     History of transfusion     Hypertension     Migraine        Patient Active Problem List   Diagnosis    Cellulitis    Insomnia    Headache, chronic migraine without aura    Generalized anxiety disorder    Essential hypertension    GERD (gastroesophageal reflux disease)       Medications:      Current Outpatient Medications   Medication Sig Dispense Refill    Cholecalciferol (VITAMIN D3 PO) Take 1 tablet by mouth daily      clonazePAM (KlonoPIN) 0 5 mg tablet Take 0 5 mg by mouth daily    0    cyanocobalamin (VITAMIN B-12) 1,000 mcg tablet Take 1,000 mcg by mouth daily        cyproheptadine (PERIACTIN) 4 mg tablet Take 1 tablet (4 mg total) by mouth daily (Patient taking differently: Take 4 mg by mouth as needed ) 30 tablet 0    divalproex sodium (DEPAKOTE) 250 mg EC tablet 2 in the morning and 2 at bedtime 120 tablet 2    EMGALITY 120 MG/ML SOAJ Inject 1 Dose under the skin every 30 (thirty) days 1 pen 11    ketorolac (TORADOL) 30 mg/mL injection Inject 2 mL (60 mg total) into a muscle once for 1 dose (Patient taking differently: Inject 60 mg into a muscle as needed  ) 10 mL 6    Magnesium Oxide 250 MG TABS Take 1 tablet by mouth daily        metoprolol succinate (TOPROL-XL) 100 mg 24 hr tablet Take 1 tablet by mouth daily      omeprazole (PriLOSEC) 40 MG capsule Take 40 mg by mouth daily  0    prochlorperazine (COMPAZINE) 10 mg tablet 1 tab at onset of migraine, can repeat in 8 hours, can take with triptan/NSAID 10 tablet 3    Red Yeast Rice Extract (RED YEAST RICE PO) Take 1 tablet by mouth daily      Riboflavin (VITAMIN B-2) 100 MG TABS Take 1 tablet by mouth daily        rizatriptan (MAXALT-MLT) 10 MG disintegrating tablet Take 1 tablet (10 mg total) by mouth as needed for migraine Limit of 3/week or 9/month 12 tablet 5    Syringe/Needle, Disp, (SYRINGE 3CC/96NF9-7/2") 22G X 1-1/2" 3 ML MISC by Does not apply route as needed (headaches) As needed for headaches 5 each 0    topiramate (TOPAMAX) 50 MG tablet 1 in the morning and 2 at bedtime 90 tablet 6    triamcinolone (KENALOG) 0 1 % ointment Apply 1 application topically as needed   0    venlafaxine (EFFEXOR-XR) 75 mg 24 hr capsule take 1 capsule by mouth once daily (Patient taking differently: Take 175 mg by mouth daily ) 30 capsule 6    hydrOXYzine HCL (ATARAX) 25 mg tablet 1 tab at bedtime nightly 30 tablet 1     No current facility-administered medications for this visit  Allergies:       Allergies   Allergen Reactions    Ceftazidime      [facility]    Cefuroxime      [facility]  [facility]    Oxymetazoline      [facility]    Tetracycline Rash       Family History:     Family History   Problem Relation Age of Onset    Hypertension Mother     Bipolar disorder Family        Social History:     Social History     Socioeconomic History    Marital status: Single     Spouse name: Not on file    Number of children: Not on file    Years of education: Not on file    Highest education level: Not on file   Occupational History    Not on file   Social Needs    Financial resource strain: Not on file    Food insecurity     Worry: Not on file     Inability: Not on file    Transportation needs     Medical: Not on file     Non-medical: Not on file   Tobacco Use    Smoking status: Current Every Day Smoker     Packs/day: 0 25     Years: 10 00     Pack years: 2 50     Types: Cigarettes    Smokeless tobacco: Never Used    Tobacco comment: Pt  may be interested in future   Substance and Sexual Activity    Alcohol use: No    Drug use: No    Sexual activity: Yes     Partners: Male     Birth control/protection: Post-menopausal   Lifestyle    Physical activity     Days per week: Not on file     Minutes per session: Not on file    Stress: Not on file   Relationships    Social connections     Talks on phone: Not on file     Gets together: Not on file     Attends Holiness service: Not on file     Active member of club or organization: Not on file     Attends meetings of clubs or organizations: Not on file     Relationship status: Not on file    Intimate partner violence     Fear of current or ex partner: Not on file     Emotionally abused: Not on file     Physically abused: Not on file     Forced sexual activity: Not on file   Other Topics Concern    Not on file   Social History Narrative    Not on file         Objective:   Physical Exam:                                                                   Vitals:            /90 (BP Location: Left arm, Patient Position: Sitting, Cuff Size: Standard)   Pulse 67   Temp (!) 96 5 °F (35 8 °C) (Temporal)   Ht 5' 3" (1 6 m)   Wt 63 9 kg (140 lb 14 4 oz)   BMI 24 96 kg/m²   BP Readings from Last 3 Encounters:   08/26/20 140/90   11/18/19 136/96   08/20/19 132/83     Pulse Readings from Last 3 Encounters:   08/26/20 67   08/20/19 70   12/31/18 85            CONSTITUTIONAL: Well developed, well nourished, well groomed  No dysmorphic features  Eyes:  PERRLA, EOM normal      Neck:  Normal ROM, neck supple  HEENT:  Normocephalic atraumatic  No meningismus  Oropharynx is clear and moist  No oral mucosal lesions  Chest:  Respirations regular and unlabored  Cardiovascular:  Distal extremities warm without palpable edema or tenderness, no observed significant swelling  Musculoskeletal:  Full range of motion  Skin:  warm and dry   Psychiatric:  Normal behavior and appropriate affect      Neurological Examination:   Mental status/cognitive function: Orientated to time, place and person  Cranial Nerves: 2 to 12 intact    Motor Exam:  Moving all extremities without any difficulty    Sensory:  Intact to light touch throughout    Reflexes:  Not checked today    Coordination: Finger to nose intact bilaterally, no tremor noted    Gait: - Steady casual gait         Review of Systems:   Review of Systems  Review of Systems   Constitutional: Negative  HENT: Negative      Eyes: Positive for photophobia  Respiratory: Negative  Cardiovascular: Negative  Gastrointestinal: Positive for nausea  Endocrine: Negative  Genitourinary: Negative  Musculoskeletal: Negative  Skin: Negative  Allergic/Immunologic: Negative  Neurological: Positive for dizziness, tremors, light-headedness and headaches  Hematological: Negative  Psychiatric/Behavioral: Positive for sleep disturbance (Trouble falling and staying asleep )  The patient is nervous/anxious  I have spent 25 minutes with Patient  today in which greater than 50% of this time was spent in counseling/coordination of care regarding Diagnostic results, Prognosis, Risks and benefits of TX options,  instructions for management, Patient and family education, Importance of TX compliance, Risk factor reductions, Impressions and Plan of care as above           Author:  Darwin Ellis MD 8/26/2020 9:55 AM

## 2020-08-26 NOTE — PROGRESS NOTES
Review of Systems   Constitutional: Negative  HENT: Negative  Eyes: Positive for photophobia  Respiratory: Negative  Cardiovascular: Negative  Gastrointestinal: Positive for nausea  Endocrine: Negative  Genitourinary: Negative  Musculoskeletal: Negative  Skin: Negative  Allergic/Immunologic: Negative  Neurological: Positive for dizziness, tremors, light-headedness and headaches  Hematological: Negative  Psychiatric/Behavioral: Positive for sleep disturbance (Trouble falling and staying asleep )  The patient is nervous/anxious

## 2020-09-28 DIAGNOSIS — G43.709 CHRONIC MIGRAINE WITHOUT AURA WITHOUT STATUS MIGRAINOSUS, NOT INTRACTABLE: ICD-10-CM

## 2020-09-29 RX ORDER — RIZATRIPTAN BENZOATE 10 MG/1
TABLET, ORALLY DISINTEGRATING ORAL
Qty: 12 TABLET | Refills: 5 | Status: SHIPPED | OUTPATIENT
Start: 2020-09-29 | End: 2021-07-28 | Stop reason: SDUPTHER

## 2020-09-29 RX ORDER — GALCANEZUMAB 120 MG/ML
INJECTION, SOLUTION SUBCUTANEOUS
Qty: 1 PEN | Refills: 11 | Status: SHIPPED | OUTPATIENT
Start: 2020-09-29 | End: 2021-07-28

## 2020-11-04 ENCOUNTER — TELEPHONE (OUTPATIENT)
Dept: NEUROLOGY | Facility: CLINIC | Age: 60
End: 2020-11-04

## 2020-11-04 ENCOUNTER — OFFICE VISIT (OUTPATIENT)
Dept: NEUROLOGY | Facility: CLINIC | Age: 60
End: 2020-11-04
Payer: COMMERCIAL

## 2020-11-04 VITALS
BODY MASS INDEX: 24.24 KG/M2 | TEMPERATURE: 97.2 F | WEIGHT: 142 LBS | SYSTOLIC BLOOD PRESSURE: 118 MMHG | DIASTOLIC BLOOD PRESSURE: 80 MMHG | HEIGHT: 64 IN | HEART RATE: 65 BPM

## 2020-11-04 DIAGNOSIS — G43.709 CHRONIC MIGRAINE WITHOUT AURA WITHOUT STATUS MIGRAINOSUS, NOT INTRACTABLE: ICD-10-CM

## 2020-11-04 DIAGNOSIS — R25.1 TREMOR: Primary | ICD-10-CM

## 2020-11-04 PROCEDURE — 99214 OFFICE O/P EST MOD 30 MIN: CPT | Performed by: PHYSICIAN ASSISTANT

## 2020-11-04 RX ORDER — LAMOTRIGINE 25 MG/1
TABLET ORAL
Qty: 140 TABLET | Refills: 1 | Status: SHIPPED | OUTPATIENT
Start: 2020-11-04 | End: 2021-02-08

## 2020-11-04 RX ORDER — KETOROLAC TROMETHAMINE 30 MG/ML
60 INJECTION, SOLUTION INTRAMUSCULAR; INTRAVENOUS AS NEEDED
Qty: 10 ML | Refills: 4 | Status: SHIPPED | OUTPATIENT
Start: 2020-11-04 | End: 2022-01-31

## 2020-11-04 RX ORDER — QUETIAPINE FUMARATE 25 MG/1
25 TABLET, FILM COATED ORAL
Qty: 30 TABLET | Refills: 1 | Status: SHIPPED | OUTPATIENT
Start: 2020-11-04 | End: 2020-12-28

## 2020-11-22 DIAGNOSIS — G43.709 CHRONIC MIGRAINE WITHOUT AURA WITHOUT STATUS MIGRAINOSUS, NOT INTRACTABLE: ICD-10-CM

## 2020-11-22 RX ORDER — DIVALPROEX SODIUM 250 MG/1
TABLET, DELAYED RELEASE ORAL
Qty: 120 TABLET | Refills: 2 | Status: SHIPPED | OUTPATIENT
Start: 2020-11-22 | End: 2021-07-28

## 2020-12-01 DIAGNOSIS — G43.709 CHRONIC MIGRAINE WITHOUT AURA WITHOUT STATUS MIGRAINOSUS, NOT INTRACTABLE: ICD-10-CM

## 2020-12-02 RX ORDER — PROCHLORPERAZINE MALEATE 10 MG
TABLET ORAL
Qty: 10 TABLET | Refills: 3 | Status: SHIPPED | OUTPATIENT
Start: 2020-12-02 | End: 2021-12-08

## 2020-12-03 DIAGNOSIS — G43.709 CHRONIC MIGRAINE WITHOUT AURA WITHOUT STATUS MIGRAINOSUS, NOT INTRACTABLE: ICD-10-CM

## 2020-12-03 RX ORDER — DEXAMETHASONE 1 MG
TABLET ORAL
Qty: 10 TABLET | Refills: 0 | Status: SHIPPED | OUTPATIENT
Start: 2020-12-03 | End: 2021-07-28 | Stop reason: SDUPTHER

## 2020-12-09 ENCOUNTER — TELEPHONE (OUTPATIENT)
Dept: NEUROLOGY | Facility: CLINIC | Age: 60
End: 2020-12-09

## 2020-12-27 DIAGNOSIS — G43.709 CHRONIC MIGRAINE WITHOUT AURA WITHOUT STATUS MIGRAINOSUS, NOT INTRACTABLE: ICD-10-CM

## 2020-12-28 RX ORDER — QUETIAPINE FUMARATE 25 MG/1
TABLET, FILM COATED ORAL
Qty: 30 TABLET | Refills: 1 | Status: SHIPPED | OUTPATIENT
Start: 2020-12-28 | End: 2021-02-18 | Stop reason: SDUPTHER

## 2021-01-20 ENCOUNTER — TELEPHONE (OUTPATIENT)
Dept: NEUROLOGY | Facility: CLINIC | Age: 61
End: 2021-01-20

## 2021-01-20 NOTE — TELEPHONE ENCOUNTER
Pt called and states that her insurance changed  She still have [employer] blue shield but different number  Effective date 1/1/21  Pt states that [facility] has her new ins info  rx bin [id number]  pcn (no pcn) per pt  Group [id number]   ID [id number]     States that she enrolled for emgality savings card, but PA still needs to be initiated  Pt states that emgality is working for her  Prior emgality, she was getting >15 migraines per month  Now, 12 migraines per month d/t stress  Due to COVID, her anxiety is much worse  COVID has exacerbated her headaches    [facility], spoke w/ PAYTON  He gave me pt's old ins info  Corrinne Ambrosia: [id number]      PCN: Karlo Osei group: [id number]  ID: [id number]  [phone]    Called PA dept, spoke w/ Duane Pallas and advised of all of the above  States that pt account is client managed  I was transferred to another dept [phone]  Spoke w/ Joceline Sevilla and advised of all of the above  States that the previous PA did transfer over  Good through 6/2021  No PA is required at this time  Called and left a detailed message on pt's answering machine of the above and for a call back if any additional info or concern         [phone] ok to detailed message

## 2021-02-07 DIAGNOSIS — G43.709 CHRONIC MIGRAINE WITHOUT AURA WITHOUT STATUS MIGRAINOSUS, NOT INTRACTABLE: ICD-10-CM

## 2021-02-08 ENCOUNTER — TELEPHONE (OUTPATIENT)
Dept: NEUROLOGY | Facility: CLINIC | Age: 61
End: 2021-02-08

## 2021-02-08 RX ORDER — LAMOTRIGINE 25 MG/1
100 TABLET ORAL
Qty: 120 TABLET | Refills: 6 | Status: SHIPPED | OUTPATIENT
Start: 2021-02-08 | End: 2021-02-16

## 2021-02-08 NOTE — TELEPHONE ENCOUNTER
Called to remind patient of their upcoming appointment in 2 weeks in [facility]  Asked if there has been any change in symptoms or if patient has any urgent concerns prior to their appointment  Patient stated that they are doing well and no changes to report  Patient made aware that we will be calling back two days prior to appointment to complete COVID screening

## 2021-02-16 DIAGNOSIS — G43.709 CHRONIC MIGRAINE WITHOUT AURA WITHOUT STATUS MIGRAINOSUS, NOT INTRACTABLE: ICD-10-CM

## 2021-02-16 RX ORDER — LAMOTRIGINE 25 MG/1
100 TABLET ORAL
Qty: 120 TABLET | Refills: 6 | Status: SHIPPED | OUTPATIENT
Start: 2021-02-16 | End: 2021-02-18 | Stop reason: SDUPTHER

## 2021-02-17 ENCOUNTER — TELEPHONE (OUTPATIENT)
Dept: NEUROLOGY | Facility: CLINIC | Age: 61
End: 2021-02-17

## 2021-02-17 NOTE — PROGRESS NOTES
Review of Systems   Constitutional: Negative  HENT: Negative  Eyes: Negative  Respiratory: Negative  Cardiovascular: Negative  Gastrointestinal: Positive for nausea  Endocrine: Negative  Genitourinary: Negative  Musculoskeletal: Negative  Skin: Negative  Allergic/Immunologic: Negative  Neurological: Positive for headaches  Hematological: Negative  Psychiatric/Behavioral: Positive for sleep disturbance  The patient is nervous/anxious

## 2021-02-17 NOTE — PROGRESS NOTES
Virtual Regular Visit    Problem List Items Addressed This Visit        Cardiovascular and Mediastinum    Headache, chronic migraine without aura - Primary    Relevant Medications    lamoTRIgine (LaMICtal) 100 mg tablet    QUEtiapine (SEROquel) 25 mg tablet    lamoTRIgine (LaMICtal) 25 mg tablet               Hypertension:  - On metoprolol 100 mg daily     Generalized anxiety:  - venlafaxine 225 mg in a m    - Hydroxyzine 25 mg  One at bedtime  -Klonopin 0 5 mg daily     Chronic migraine headache without aura:    Preventive therapy:  - continue magnesium 500 mg and vitamin B2 100 mg 1 tab daily  - cyproheptadine 4 mg at bedtime for weather related headahces  - Topamax to 50 mg in the morning and 100 mg at bedtime  - Lamictal 50mg in am and 100mg in pm  - Seroquel 50mg at bedtime  - Emgality 120 mg   Abortive therapy for migraine headaches:   - at the onset of a migraine headache she is to take Maxalt sublingual 10 mg with Compazine 10 mg   If in 2 hours her headache has not improved she has take Decadron 1 mg  She may also take cyproheptadine 4 mg at bedtime that night to help with headaches    - for her mild headaches pain intensity between 1 and 4 she is taking Compazine 10 mg which does seem to abort the milder headache     ___________________________________________________________________________________________________________         Reason for visit is headache    Encounter provider Nayeil Mckeon MD    Provider located at [address] [place] [address]  [phone]      Recent Visits  Date Type Provider Dept   02/17/21 Telephone Jarrell Vigil, [address] [place] [address] recent visits within past 7 days and meeting all other requirements     Today's Visits  Date Type Provider Dept   02/18/21 Ποσειδώνος MD Tuan  [facility] [address] [place] [address] today's visits and meeting all other requirements Future Appointments  No visits were found meeting these conditions  Showing future appointments within next 150 days and meeting all other requirements        After connecting through [employer], the patient was identified by name and date of birth  Bo Armstrong was informed that this is a telemedicine visit and that the visit is being conducted through telephone which may not be secure and therefore, might not be HIPAA-compliant  My office door was closed  No one else was in the room  She acknowledged consent and understanding of privacy and security of the video platform  The patient has agreed to participate and understands they can discontinue the visit at any time  Patient is aware that this is a billable visit  Subjective  We had the pleasure of evaluating Bo Armstrong in neurological follow up today  As you know she is a 66 year old right handed female  She is a  and is here today for evaluation of her headaches          Past medical history:  QTC: 411/25/2019 - 408   -GERD  - hypertension  - depression /anxiety     Depression/generalized anxiety:   Since last seen patient states that due to COVID her anxiety is much worse  COVID has also exacerbated her headaches  She states she is under a lot of stress at this time as her mother moved away from her and her dog passed away  Due to this increased stress patient is now having more migraine headaches       Internal tremor:   Pt states that after her flu vaccination in October ob 2019, she developed this internal tremor  Describes it as a sensation of internal shaking but it time has noted that her arms and whole body shakes as well       Onset: after the flu vaccination  Occurs: It tends to occurs 3-4 times a week  It improved and started again with her current stress  Lasts: all day or at time will go into the next day  Trigger: none  She was given hydroxyzine 25mg  by her PCP and per pt it did not help   She took it for few days intermittently      Chronic Migraine headaches :  What medications do you take or have you taken for your headaches/mood/BP? Preventative:  - vitamin-D 3, vitamin B12 1000 micro g, magnesium 250 mg, vitamin B2 100 mg, magnesium sulfate 2 g IV,  - Daith earring: in place and did help in the beginning and then did not thus pt took it out  - Botox (x 2 with no help), Emgality 120mg   - Toprol 100 mg,   - Zoloft 100 mg, amitriptyline 10 mg,  venlafaxine 150 mg  - Klonopin 0 5 mg  - olanzapine 5 mg, risperidone 1 mg,  - gabapentin, Topamax 150 mg , Depakote 500 mg q h s , Lamictal 100 mg q h s  (2018)  - cyproheptadine 4 mg  (did not help), Benadryl 50 mg, hydroxyzine 25 mg,  - Robaxin 1 g,  Abortive:   - Excedrin migraine,  - Imitrex 100 mg p o ,, Maxalt 10 mg sublingual,  - DHE 1 mg (IV infusion -2018)  - Toradol 30/60 mg injection  - Compazine 10 mg, Zofran 4 mg,   - Prednisone (no headaches with steroids) she took it for a week,         Since last seen patient states that she was doing really well with her medication but over the last few months her mother moved away from her and her dog recently passed away which has caused a lot of anxiety and stress and exacerbate her headaches  What is your current pain? 0/10     How often do the headaches occur ?   Mild headache: none  Mod to severe: 12 ore more     Are you ever headache free? At least 2 a week       Aura/warning prior to headache onset? none     What time of the day do the headaches start? Thena Grass still occur in am and now can occur in evening as well     How long do the headaches last: all day? Mild headaches:  Few hours if she can take Compazine early on and can get rid of it  Moderate to severe headache:  Few hours with Maxalt as it works 80% of the time    If this fails may last half of the day as she has to take the Decadron to aborted        Where are they located? frontal, temporal and jaw pain     What is the intensity of pain? 8-10/10 a few times a months, usually in the morning 6/10     Describe your usual headache? Throbbing, pressure, achy, shooting, stabbing     Associated symptoms:   -  nausea, vomiting  - photophobia, phonophobia, sensitivity to smell  -  concentration problems  -  light-headed or dizzy  -  prefer to be alone and in a dark room        Number of days missed per month because of headaches:  Work (or school) days: none  Social or Family activities: none     Headache trigger: Fatigue, Stress/Tension, chocolate and turkey  Have you used CBD or THC for your headaches and how often? No  Are you current pregnant or planning on getting pregnant? No     Have you ever had any Brain imaging? yes    I personally reviewed these images  Recent laboratory data was reviewed  Medications and allergies were reviewed    Reviewed old notes from physician seen in the past      17 MRI C spine    IMPRESSION:Mild spondylotic changes are similar to the previous study      17 MRI Brain  IMPRESSION:No acute infarction, intracranial hemorrhage or mass        Past Medical History:   Diagnosis Date    Anxiety     Depression     Disease of thyroid gland     History of transfusion     Hypertension     Migraine        Past Surgical History:   Procedure Laterality Date     SECTION Bilateral 1995    HYSTERECTOMY      LAPAROSCOPIC ENDOMETRIOSIS FULGURATION         Current Outpatient Medications   Medication Sig Dispense Refill    Cholecalciferol (VITAMIN D3 PO) Take 1 tablet by mouth daily      clonazePAM (KlonoPIN) 0 5 mg tablet Take 0 5 mg by mouth daily    0    cyanocobalamin (VITAMIN B-12) 1,000 mcg tablet Take 1,000 mcg by mouth daily        cyproheptadine (PERIACTIN) 4 mg tablet Take 1 tablet (4 mg total) by mouth daily (Patient taking differently: Take 4 mg by mouth as needed ) 30 tablet 0    dexamethasone (DECADRON) 1 mg tablet 1 at the onset of a severe headache with food 10 tablet 0    Emgality 120 MG/ML SOAJ inject 120 milligrams subcutaneously EVERY 30 DAYS 1 pen 11    hydrOXYzine HCL (ATARAX) 25 mg tablet 1 tab at bedtime nightly 30 tablet 1    ketorolac (TORADOL) 30 mg/mL injection Inject 2 mL (60 mg total) into a muscle as needed (migraine) 10 mL 4    lamoTRIgine (LaMICtal) 100 mg tablet Take 1 tablet (100 mg total) by mouth daily at bedtime 100mg at bedtime 30 tablet 3    Magnesium Oxide 250 MG TABS Take 1 tablet by mouth daily        metoprolol succinate (TOPROL-XL) 100 mg 24 hr tablet Take 1 tablet by mouth daily      omeprazole (PriLOSEC) 40 MG capsule Take 40 mg by mouth daily  0    prochlorperazine (COMPAZINE) 10 mg tablet 1 tab at onset of migraine, can repeat in 8 hours, can take with triptan/NSAID 10 tablet 3    QUEtiapine (SEROquel) 25 mg tablet Take 1 tablet (25 mg total) by mouth daily at bedtime Two at bedtime 60 tablet 3    Red Yeast Rice Extract (RED YEAST RICE PO) Take 1 tablet by mouth daily      Riboflavin (VITAMIN B-2) 100 MG TABS Take 1 tablet by mouth daily        rizatriptan (MAXALT-MLT) 10 MG disintegrating tablet take 1 tablet by mouth if needed for migraines LIMIT OF 3 PER WEEK OR 9 PER MONTH 12 tablet 5    Syringe/Needle, Disp, (SYRINGE 3CC/04TV8-3/2") 22G X 1-1/2" 3 ML MISC by Does not apply route as needed (headaches) As needed for headaches 5 each 0    topiramate (TOPAMAX) 50 MG tablet 1 in the morning and 2 at bedtime 90 tablet 6    triamcinolone (KENALOG) 0 1 % ointment Apply 1 application topically as needed   0    venlafaxine (EFFEXOR-XR) 75 mg 24 hr capsule take 1 capsule by mouth once daily (Patient taking differently: Take 225 mg by mouth daily ) 30 capsule 6    divalproex sodium (DEPAKOTE) 250 mg EC tablet take 2 tablets by mouth every morning and 2 tablets at bedtime (Patient not taking: Reported on 2/17/2021) 120 tablet 2    lamoTRIgine (LaMICtal) 25 mg tablet 50mg in am 60 tablet 3     No current facility-administered medications for this visit  Allergies   Allergen Reactions    Ceftazidime      [facility]    Cefuroxime      [facility]  [facility]    Oxymetazoline      [facility]    Tetracycline Rash       Review of Systems  Review of Systems   Constitutional: Negative  HENT: Negative  Eyes: Negative  Respiratory: Negative  Cardiovascular: Negative  Gastrointestinal: Positive for nausea  Endocrine: Negative  Genitourinary: Negative  Musculoskeletal: Negative  Skin: Negative  Allergic/Immunologic: Negative  Neurological: Positive for headaches  Hematological: Negative  Psychiatric/Behavioral: Positive for sleep disturbance  The patient is nervous/anxious  I spent 25 minutes with the patient during this visit today in which greater than 50% of this time was spent in counseling/coordination of care regarding Diagnostic results, Prognosis, Risks and benefits of treatment options,  instructions for management, Patient and family education, Importance of treatment compliance, Risk factor reductions, Impressions and Plan of care as above           Author:  Shashank Goetz MD

## 2021-02-17 NOTE — TELEPHONE ENCOUNTER
Called patient and I switched her 02/18/21 appointment over to a virtual telephone due to the impending weather  Patient has been checked in and the chart has been prepped

## 2021-02-18 ENCOUNTER — TELEMEDICINE (OUTPATIENT)
Dept: NEUROLOGY | Facility: CLINIC | Age: 61
End: 2021-02-18
Payer: COMMERCIAL

## 2021-02-18 DIAGNOSIS — G43.709 CHRONIC MIGRAINE WITHOUT AURA WITHOUT STATUS MIGRAINOSUS, NOT INTRACTABLE: ICD-10-CM

## 2021-02-18 DIAGNOSIS — G43.709 HEADACHE, CHRONIC MIGRAINE WITHOUT AURA: Primary | ICD-10-CM

## 2021-02-18 PROCEDURE — 99213 OFFICE O/P EST LOW 20 MIN: CPT | Performed by: PSYCHIATRY & NEUROLOGY

## 2021-02-18 RX ORDER — LAMOTRIGINE 25 MG/1
TABLET ORAL
Qty: 60 TABLET | Refills: 3 | Status: SHIPPED | OUTPATIENT
Start: 2021-02-18 | End: 2021-07-28 | Stop reason: SDUPTHER

## 2021-02-18 RX ORDER — LAMOTRIGINE 100 MG/1
100 TABLET ORAL
Qty: 30 TABLET | Refills: 3 | Status: SHIPPED | OUTPATIENT
Start: 2021-02-18 | End: 2021-06-21

## 2021-02-18 RX ORDER — QUETIAPINE FUMARATE 25 MG/1
25 TABLET, FILM COATED ORAL
Qty: 60 TABLET | Refills: 3 | Status: SHIPPED | OUTPATIENT
Start: 2021-02-18 | End: 2021-06-21

## 2021-02-18 NOTE — PATIENT INSTRUCTIONS
Chronic migraine headache without aura:    Preventive therapy:  - continue magnesium 500 mg and vitamin B2 100 mg 1 tab daily  - cyproheptadine 4 mg at bedtime for weather related headahces  - Topamax to 50 mg in the morning and 100 mg at bedtime  - Lamictal 50mg in am and 100mg in pm  - Seroquel 50mg at bedtime  - Emgality 120 mg   Abortive therapy for migraine headaches:   - at the onset of a migraine headache she is to take Maxalt sublingual 10 mg with Compazine 10 mg   If in 2 hours her headache has not improved she has take Decadron 1 mg  She may also take cyproheptadine 4 mg at bedtime that night to help with headaches  - for her mild headaches pain intensity between 1 and 4 she is taking Compazine 10 mg which does seem to abort the milder headache

## 2021-03-23 ENCOUNTER — OFFICE VISIT (OUTPATIENT)
Dept: NEUROLOGY | Facility: CLINIC | Age: 61
End: 2021-03-23
Payer: COMMERCIAL

## 2021-03-23 VITALS
WEIGHT: 138.6 LBS | DIASTOLIC BLOOD PRESSURE: 80 MMHG | SYSTOLIC BLOOD PRESSURE: 134 MMHG | HEART RATE: 78 BPM | BODY MASS INDEX: 23.66 KG/M2 | HEIGHT: 64 IN

## 2021-03-23 DIAGNOSIS — R25.1 TREMOR: Primary | ICD-10-CM

## 2021-03-23 DIAGNOSIS — F41.1 GENERALIZED ANXIETY DISORDER: ICD-10-CM

## 2021-03-23 PROCEDURE — 99214 OFFICE O/P EST MOD 30 MIN: CPT | Performed by: PHYSICIAN ASSISTANT

## 2021-03-23 NOTE — Clinical Note
Alaina, could we try and reach out to the PCP,  (Dr Severiano White with [facility]), to see if they would be open to SWITCHING her Metoprolol to Propranolol to see if this would help with her tremors and anxiety as well  Thnayelyks!

## 2021-03-23 NOTE — PATIENT INSTRUCTIONS
Discussed treatment options for her tremors  At this time she is on metoprolol for her blood pressure managed by her PCP  Have asked that she contact her PCP (Dr Erica Gurrola with [facility]) to see if they would be open to [facility] her Metoprolol to Propranolol to see if this would help with her tremors as well

## 2021-03-23 NOTE — ASSESSMENT & PLAN NOTE
Patient with postural and action tremors in both hands which she has had for years  More recently however she developed intermittent full body tremors which she will feel about once a week  She has no clear trigger for these tremors  On exam today she had fine tremors in both hands with slightly tremulous spirals  No resting tremor or parkinsonian symptoms observed  We did discuss that she may perhaps have some underlying essential tremor versus enhanced physiological tremors  She had tried hydroxyzine in the past with no clear benefit  She is already taking topiramate for underlying migraines  In the past her Depakote was switched to Lamictal with no improvement of tremors  At this time we discussed the options including switching her metoprolol to propanolol  She does take metoprolol for hypertension which is managed by by her PCP  We will reach out to her PCP to see if they would be okay with making this switch  The goal will be that the propanolol could also help with tremor and anxiety  Will continue to monitor along with the headache team and watch for any evidence of progression with time  Recent TSH normal   Will get some updated labs   Brain MRI from 2017 was normal

## 2021-06-19 DIAGNOSIS — G43.709 CHRONIC MIGRAINE WITHOUT AURA WITHOUT STATUS MIGRAINOSUS, NOT INTRACTABLE: ICD-10-CM

## 2021-06-21 RX ORDER — QUETIAPINE FUMARATE 25 MG/1
TABLET, FILM COATED ORAL
Qty: 60 TABLET | Refills: 3 | Status: SHIPPED | OUTPATIENT
Start: 2021-06-21 | End: 2021-10-27

## 2021-06-21 RX ORDER — LAMOTRIGINE 100 MG/1
TABLET ORAL
Qty: 30 TABLET | Refills: 3 | Status: SHIPPED | OUTPATIENT
Start: 2021-06-21 | End: 2021-10-27

## 2021-06-30 NOTE — TELEPHONE ENCOUNTER
S/W pt and scheduled appointment.    lily from [facility] called regarding emgality PA   he states that this was deleted on cmm on   The only approval in chart is for 2 injections approved from 19-19  I called clinical services at [phone] and spoke to Anastasiia Tirado   emgality was approved through 7/3/19 and has now   Approval number for this PA-  [id number]      Will need to complete a new PA

## 2021-07-28 ENCOUNTER — OFFICE VISIT (OUTPATIENT)
Dept: NEUROLOGY | Facility: CLINIC | Age: 61
End: 2021-07-28
Payer: COMMERCIAL

## 2021-07-28 ENCOUNTER — TELEPHONE (OUTPATIENT)
Dept: NEUROLOGY | Facility: CLINIC | Age: 61
End: 2021-07-28

## 2021-07-28 VITALS
SYSTOLIC BLOOD PRESSURE: 143 MMHG | WEIGHT: 139.1 LBS | TEMPERATURE: 98.4 F | DIASTOLIC BLOOD PRESSURE: 89 MMHG | HEART RATE: 69 BPM | BODY MASS INDEX: 23.75 KG/M2 | HEIGHT: 64 IN

## 2021-07-28 DIAGNOSIS — F41.1 GENERALIZED ANXIETY DISORDER: ICD-10-CM

## 2021-07-28 DIAGNOSIS — G43.709 HEADACHE, CHRONIC MIGRAINE WITHOUT AURA: ICD-10-CM

## 2021-07-28 DIAGNOSIS — I10 ESSENTIAL HYPERTENSION: ICD-10-CM

## 2021-07-28 DIAGNOSIS — R25.1 TREMOR: ICD-10-CM

## 2021-07-28 DIAGNOSIS — G43.709 CHRONIC MIGRAINE WITHOUT AURA WITHOUT STATUS MIGRAINOSUS, NOT INTRACTABLE: Primary | ICD-10-CM

## 2021-07-28 PROCEDURE — 99215 OFFICE O/P EST HI 40 MIN: CPT | Performed by: PHYSICIAN ASSISTANT

## 2021-07-28 RX ORDER — RIZATRIPTAN BENZOATE 10 MG/1
10 TABLET, ORALLY DISINTEGRATING ORAL AS NEEDED
Qty: 12 TABLET | Refills: 5 | Status: SHIPPED | OUTPATIENT
Start: 2021-07-28 | End: 2022-07-20 | Stop reason: SDUPTHER

## 2021-07-28 RX ORDER — LAMOTRIGINE 25 MG/1
TABLET ORAL
Qty: 120 TABLET | Refills: 0 | Status: SHIPPED | OUTPATIENT
Start: 2021-07-28 | End: 2022-01-31

## 2021-07-28 RX ORDER — DEXAMETHASONE 1 MG
TABLET ORAL
Qty: 10 TABLET | Refills: 5 | Status: SHIPPED | OUTPATIENT
Start: 2021-07-28

## 2021-07-28 NOTE — PATIENT INSTRUCTIONS
Chronic migraine headache without aura:    Preventive therapy:   Depakote 250 mg: Wean off  Depakote 1 tab in am and 2 bedtime for 3 days, 1 tab twice a day for 3 days then 1 tab bedtime for 3 days then stop  - continue magnesium 500 mg and vitamin B2 100 mg 1 tab daily  - cyproheptadine 4 mg at bedtime for weather related headahces  - Topamax 50 mg in the morning and 100 mg at bedtime  - Increase Lamictal 25 mg in am for 7 days with 100mg in pm; 2 tabs in am for 7 days with 100 mg at bedtime, 3 tabs in am for 7 days with 100 mg at bedtime then 100 mg twice a day  - Seroquel 50mg at bedtime  - We will submit for authorization for Vyepti 300 mg every 3 months    Abortive therapy for migraine headaches:   - at the onset of a migraine headache she is to take Maxalt sublingual 10 mg with Compazine 10 mg   If in 2 hours her headache has not improved she has take Decadron 1 mg  She may also take cyproheptadine 4 mg at bedtime that night to help with headaches  - for her mild headaches pain intensity between 1 and 4 she is taking Compazine 10 mg which does seem to abort the milder headache

## 2021-07-28 NOTE — TELEPHONE ENCOUNTER
called heron at [phone] and spoke to [facility]  Ronald Estrada Banner-, [id number], [id number]  States that she does not handle [id number]  She then transferred to me injectable med dept  Spoke to [employer] above codes  j code needs auth , nia scott, can call [phone]  Admin codes- no auth needed  Enhanced level-  $1200 deductible per calendar year  1125 39 applied to OOP already  No other copays or deductibles  Can Buy and bill  MFX#[id number]    Called nia scott at [phone] and spoke to [facility]  PA can't be done over the phone  She will fax PA form to CV office  Form received  Completed PA form and faxed to [facility] along with office note  Placed at  to be scanned into chart  Awaiting determination     Called pt and she prefers [facility]    States that she   Can't do Tuesday or fridays  Available anytime m-w-th  [phone] to leave detailed

## 2021-07-28 NOTE — ASSESSMENT & PLAN NOTE
Preventive therapy:   Depakote 250 mg: Wean off  Depakote 1 tab in am and 2 bedtime for 3 days, 1 tab twice a day for 3 days then 1 tab bedtime for 3 days then stop  - continue magnesium 500 mg and vitamin B2 100 mg 1 tab daily  - cyproheptadine 4 mg at bedtime for weather related headahces  - Topamax 50 mg in the morning and 100 mg at bedtime  - Increase Lamictal 25 mg in am for 7 days with 100mg in pm; 2 tabs in am for 7 days with 100 mg at bedtime, 3 tabs in am for 7 days with 100 mg at bedtime then 100 mg twice a day  - Seroquel 50mg at bedtime  - We will submit for authorization for Vyepti 300 mg every 3 months    Abortive therapy for migraine headaches:   - at the onset of a migraine headache she is to take Maxalt sublingual 10 mg with Compazine 10 mg   If in 2 hours her headache has not improved she has take Decadron 1 mg  She may also take cyproheptadine 4 mg at bedtime that night to help with headaches  - for her mild headaches pain intensity between 1 and 4 she is taking Compazine 10 mg which does seem to abort the milder headache

## 2021-07-28 NOTE — PROGRESS NOTES
[facility]  PATIENT:  Angelo Palomares  MRN:  [id number]  :  1960  DATE OF SERVICE:  2021      Assessment/Plan:     Headache, chronic migraine without aura  Preventive therapy:   Depakote 250 mg: Wean off  Depakote 1 tab in am and 2 bedtime for 3 days, 1 tab twice a day for 3 days then 1 tab bedtime for 3 days then stop  - continue magnesium 500 mg and vitamin B2 100 mg 1 tab daily  - cyproheptadine 4 mg at bedtime for weather related headahces  - Topamax 50 mg in the morning and 100 mg at bedtime  - Increase Lamictal 25 mg in am for 7 days with 100mg in pm; 2 tabs in am for 7 days with 100 mg at bedtime, 3 tabs in am for 7 days with 100 mg at bedtime then 100 mg twice a day  - Seroquel 50mg at bedtime  - We will submit for authorization for Vyepti 300 mg every 3 months    Abortive therapy for migraine headaches:   - at the onset of a migraine headache she is to take Maxalt sublingual 10 mg with Compazine 10 mg   If in 2 hours her headache has not improved she has take Decadron 1 mg  She may also take cyproheptadine 4 mg at bedtime that night to help with headaches  - for her mild headaches pain intensity between 1 and 4 she is taking Compazine 10 mg which does seem to abort the milder headache  Problem List Items Addressed This Visit        Cardiovascular and Mediastinum    Headache, chronic migraine without aura - Primary     Preventive therapy:   Depakote 250 mg: Wean off    Depakote 1 tab in am and 2 bedtime for 3 days, 1 tab twice a day for 3 days then 1 tab bedtime for 3 days then stop  - continue magnesium 500 mg and vitamin B2 100 mg 1 tab daily  - cyproheptadine 4 mg at bedtime for weather related headahces  - Topamax 50 mg in the morning and 100 mg at bedtime  - Increase Lamictal 25 mg in am for 7 days with 100mg in pm; 2 tabs in am for 7 days with 100 mg at bedtime, 3 tabs in am for 7 days with 100 mg at bedtime then 100 mg twice a day  - Seroquel 50mg at bedtime  - We will submit for authorization for Vyepti 300 mg every 3 months    Abortive therapy for migraine headaches:   - at the onset of a migraine headache she is to take Maxalt sublingual 10 mg with Compazine 10 mg   If in 2 hours her headache has not improved she has take Decadron 1 mg  She may also take cyproheptadine 4 mg at bedtime that night to help with headaches  - for her mild headaches pain intensity between 1 and 4 she is taking Compazine 10 mg which does seem to abort the milder headache  Relevant Medications    propranolol (INNOPRAN XL) 120 MG 24 hr capsule    lamoTRIgine (LaMICtal) 25 mg tablet    dexamethasone (DECADRON) 1 mg tablet    rizatriptan (MAXALT-MLT) 10 MG disintegrating tablet    Essential hypertension    Relevant Medications    propranolol (INNOPRAN XL) 120 MG 24 hr capsule       Other    Generalized anxiety disorder    Tremor              History of Present Illness: We had the pleasure of evaluating Alfredo Treadwell in neurological follow up  today for headaches  As you know,  she is a 63 y o   right handed female  She is a  and is here today for evaluation of her headaches          Past medical history:  QTC: 411/25/2019 - 247   -GERD  - hypertension  - depression /anxiety     Depression/generalized anxiety/migraine update:   Since last seen patient states that due to COVID her anxiety is much worse   COVID has also exacerbated her headaches  Her Mom fell and broke her femur and then was hospitalized in December  Her mother moved to her sister's house in another state  Wasn't talking to her because she thought she was going to have her killed  Patient started to go to counseling over this  She has put her dad's house up for sale and is having problems with her dad's girlfriend  Can't get her injections anymore  Last Emgality injection was in June 2021        Patient also did not increase her lamictal dose in February nor stop the depakote        Internal tremor: Pt states that after her flu vaccination in October 2019, she developed this internal tremor   Describes it as a sensation of internal shaking but it time has noted that her arms and whole body shakes as well       Onset: after the flu vaccination  Occurs: It tends to occurs 3-4 times a week  Lasts: all day or at time will go into the next day  Trigger: none  She was given hydroxyzine 25mg  by her PCP and per pt it did not help  She took it for few days intermittently      Chronic Migraine headaches :  What medications do you take or have you taken for your headaches/mood/BP? Current Preventive:   vitamin D3, vitamin B12, magnesium, B2  cyproheptadine  Lamictal, Topamax    Propranolol  Venlafaxine, Seroquel, Clonazepam  Current Abortive:    rizatriptan  Prochlorperazine  Ketorolac   Dexamethasone    Prior Preventative:  - Daith earring: in place and did help in the beginning and then did not thus pt took it out  - Botox (x 2 with no help),  - Toprol 100 mg,   - Zoloft 100 mg, amitriptyline 10 mg  - olanzapine 5 mg, risperidone 1 mg,  - gabapentin, Depakote   - Benadryl 50 mg, hydroxyzine 25 mg,  - Robaxin 1 g,  - Emgality  Prior Abortive:   - Excedrin migraine,  - Imitrex  - DHE 1 mg (IV infusion -2018)  - Toradol 30/60 mg injection   - Prednisone (no headaches with steroids) she took it for a week,         What is your current pain? 2/10     How often do the headaches occur ? 12 or more a month but were getting better before the pandemic but then they got much worse after the stress of the pandemic  Mild headache: 1 a week  Mod to severe: 12 or more     Are you ever headache free? At least 2 a week       Aura/warning prior to headache onset? none     What time of the day do the headaches start?  Varies but primarily begin in the am     How long do the headaches last: all day?   Mild headaches:  all day  Moderate to severe headache:  Few hours with Maxalt as it works 80% of the time  Raul Ee this fails may last half of the day as she has to take the Decadron to aborted  Vennie Meth if 10/10 with vomiting will last rest of the day     Where are they located? frontal, temporal and jaw pain     What is the intensity of pain? 8-10/10     Describe your usual headache? Throbbing, pressure, achy, shooting, stabbing     Associated symptoms:   -  nausea, vomiting  -  photophobia, phonophobia, sensitivity to smell  -  concentration problems  -  light-headed or dizzy  -  prefer to be alone and in a dark room        Number of days missed per month because of headaches:  Work (or school) days: rarely but did miss one a few weeks ago  Social or Family activities: none     Headache trigger: Fatigue, Stress/Tension, chocolate and turkey  Have you used CBD or THC for your headaches and how often? No  Are you current pregnant or planning on getting pregnant? No     Have you ever had any Brain imaging? yes        12/27/17 MRI C spine    IMPRESSION:Mild spondylotic changes are similar to the previous study      12/27/17 MRI Brain  IMPRESSION:No acute infarction, intracranial hemorrhage or mass    I personally reviewed these images       Reviewed old notes from physician seen in the past- see above HPI for summary of previous encounters         Past Medical History:   Diagnosis Date    Anxiety     Depression     Disease of thyroid gland     History of transfusion     Hypertension     Migraine        Patient Active Problem List   Diagnosis    Cellulitis    Insomnia    Headache, chronic migraine without aura    Generalized anxiety disorder    Essential hypertension    GERD (gastroesophageal reflux disease)    Tremor       Medications:      Current Outpatient Medications   Medication Sig Dispense Refill    Cholecalciferol (VITAMIN D3 PO) Take 1 tablet by mouth daily      clonazePAM (KlonoPIN) 0 5 mg tablet Take 0 5 mg by mouth daily    0    cyanocobalamin (VITAMIN B-12) 1,000 mcg tablet Take 1,000 mcg by mouth daily        daily (Patient taking differently: Take 225 mg by mouth daily ) 30 capsule 6     No current facility-administered medications for this visit  Allergies: Allergies   Allergen Reactions    Ceftazidime      [facility]    Cefuroxime      [facility]  [facility]    Oxymetazoline      [facility]    Tetracycline Rash       Family History:     Family History   Problem Relation Age of Onset    Hypertension Mother     Bipolar disorder Family        Social History:     Social History     Socioeconomic History    Marital status: Single     Spouse name: Not on file    Number of children: Not on file    Years of education: Not on file    Highest education level: Not on file   Occupational History    Not on file   Tobacco Use    Smoking status: Current Every Day Smoker     Packs/day: 0 25     Years: 10 00     Pack years: 2 50     Types: Cigarettes    Smokeless tobacco: Never Used    Tobacco comment: Pt  may be interested in future   Vaping Use    Vaping Use: Never used   Substance and Sexual Activity    Alcohol use: No    Drug use: No    Sexual activity: Yes     Partners: Male     Birth control/protection: Post-menopausal   Other Topics Concern    Not on file   Social History Narrative    Not on file     Social Determinants of Health     Financial Resource Strain:     Difficulty of Paying Living Expenses:    Food Insecurity:     Worried About Running Out of Food in the Last Year:     Ran Out of Food in the Last Year:    Transportation Needs:     Lack of Transportation (Medical):      Lack of Transportation (Non-Medical):    Physical Activity:     Days of Exercise per Week:     Minutes of Exercise per Session:    Stress:     Feeling of Stress :    Social Connections:     Frequency of Communication with Friends and Family:     Frequency of Social Gatherings with Friends and Family:     Attends Yarsani Services:     Active Member of Clubs or Organizations:     Attends Club or Organization Meetings:     Marital Status:    Intimate Partner Violence:     Fear of Current or Ex-Partner:     Emotionally Abused:     Physically Abused:     Sexually Abused:       I have reviewed the patient's medical, social and surgical history as well as medications in detail and updated the computerized patient record  Objective:   Physical Exam:                                                                   Vitals:            /89 (BP Location: Left arm, Patient Position: Sitting, Cuff Size: Standard)   Pulse 69   Temp 98 4 °F (36 9 °C) (Temporal)   Ht 5' 4" (1 626 m)   Wt 63 1 kg (139 lb 1 6 oz)   BMI 23 88 kg/m²   BP Readings from Last 3 Encounters:   07/28/21 143/89   03/23/21 134/80   11/04/20 118/80     Pulse Readings from Last 3 Encounters:   07/28/21 69   03/23/21 78   11/04/20 65          CONSTITUTIONAL: Well developed, well nourished, well groomed  No dysmorphic features  Eyes:  EOM normal      Neck:  Normal ROM, neck supple  HEENT:  Normocephalic atraumatic  Chest:  Respirations regular and unlabored  Psychiatric:  Normal behavior and appropriate affect      MENTAL STATUS  Orientation: Alert and oriented x 3  Fund of knowledge: Intact  MOTOR (Upper and lower extremities)   Bulk/tone/abnormal movement: Normal muscle bulk and tone  COORDINATION   Station/Gait: Normal baseline gait  Review of Systems:   Review of Systems  Constitutional: Negative  HENT: Negative  Eyes: Negative  Respiratory: Negative  Cardiovascular: Negative  Gastrointestinal: Negative  Endocrine: Negative  Genitourinary: Negative  Musculoskeletal: Negative  Skin: Negative  Allergic/Immunologic: Negative  Neurological: Positive for headaches  Hematological: Negative  Psychiatric/Behavioral: depression and anxiety      I personally reviewed the ROS entered by the MA    I spent 30 minutes in face-to-face discussion regarding  the pathophysiology of her current symptoms and further plan, as well as counseling, educating, and coordinating the patient's care including pathogenesis of diagnosis, prognosis of diagnosis, diagnostic results, impression, and recommendations, risks and benefits of treatment, instructions for disease self management, treatment instructions and follow up requirements and spent 15 minutes non-face to face    Author:  [place]iana Casey PA-C 7/28/2021 2:23 PM

## 2021-07-28 NOTE — TELEPHONE ENCOUNTER
----- Message from Pratima Pritchett PA-C sent at 7/28/2021  1:57 PM EDT -----  Please Brennan Ryder for Vyepti 300 mg every 3 months    Thanks  [employer]

## 2021-07-30 ENCOUNTER — PREP FOR PROCEDURE (OUTPATIENT)
Dept: NEUROLOGY | Facility: CLINIC | Age: 61
End: 2021-07-30

## 2021-07-30 DIAGNOSIS — G43.709 CHRONIC MIGRAINE WITHOUT AURA WITHOUT STATUS MIGRAINOSUS, NOT INTRACTABLE: Primary | ICD-10-CM

## 2021-07-30 RX ORDER — SODIUM CHLORIDE 9 MG/ML
20 INJECTION, SOLUTION INTRAVENOUS ONCE
Status: CANCELLED | OUTPATIENT
Start: 2021-08-16

## 2021-07-30 NOTE — TELEPHONE ENCOUNTER
Vyepti approved from 8/2/21-9/2/21 (1 unit)    Orders entered in [facility]  Please sign off  Will then need to call infusion center and schedule pt

## 2021-08-16 ENCOUNTER — HOSPITAL ENCOUNTER (OUTPATIENT)
Dept: INFUSION CENTER | Facility: CLINIC | Age: 61
Discharge: HOME | End: 2021-08-16
Payer: COMMERCIAL

## 2021-08-16 VITALS
SYSTOLIC BLOOD PRESSURE: 132 MMHG | HEART RATE: 68 BPM | DIASTOLIC BLOOD PRESSURE: 89 MMHG | TEMPERATURE: 98.2 F | RESPIRATION RATE: 16 BRPM

## 2021-08-16 DIAGNOSIS — G43.709 CHRONIC MIGRAINE WITHOUT AURA WITHOUT STATUS MIGRAINOSUS, NOT INTRACTABLE: Primary | ICD-10-CM

## 2021-08-16 PROCEDURE — 96413 CHEMO IV INFUSION 1 HR: CPT

## 2021-08-16 RX ORDER — SODIUM CHLORIDE 9 MG/ML
20 INJECTION, SOLUTION INTRAVENOUS ONCE
Status: CANCELLED | OUTPATIENT
Start: 2021-11-08

## 2021-08-16 RX ORDER — SODIUM CHLORIDE 9 MG/ML
20 INJECTION, SOLUTION INTRAVENOUS ONCE
Status: COMPLETED | OUTPATIENT
Start: 2021-08-16 | End: 2021-08-16

## 2021-08-16 RX ADMIN — SODIUM CHLORIDE 20 ML/HR: 0.9 INJECTION, SOLUTION INTRAVENOUS at 14:08

## 2021-08-16 RX ADMIN — EPTINEZUMAB-JJMR 300 MG: 100 INJECTION INTRAVENOUS at 14:24

## 2021-08-16 NOTE — PROGRESS NOTES
Patient tolerated first Vyepti infusion with no noted adverse reactions  Next appointment scheduled and AVS and appointment card given to patient  Patient left infusion center in baseline condition

## 2021-08-23 DIAGNOSIS — G43.709 CHRONIC MIGRAINE WITHOUT AURA WITHOUT STATUS MIGRAINOSUS, NOT INTRACTABLE: ICD-10-CM

## 2021-08-23 RX ORDER — TOPIRAMATE 50 MG/1
TABLET, FILM COATED ORAL
Qty: 90 TABLET | Refills: 6 | Status: SHIPPED | OUTPATIENT
Start: 2021-08-23 | End: 2022-03-21

## 2021-09-10 ENCOUNTER — TELEPHONE (OUTPATIENT)
Dept: NEUROLOGY | Facility: CLINIC | Age: 61
End: 2021-09-10

## 2021-09-10 NOTE — TELEPHONE ENCOUNTER
Called pt and LMOM stating that I am calling in regards to r/s upcoming appt with Violetta Josue as she will not be in the office  I then asked the patient to please give us a call back to get this appt scheduled for her

## 2021-10-06 ENCOUNTER — TELEPHONE (OUTPATIENT)
Dept: NEUROLOGY | Facility: CLINIC | Age: 61
End: 2021-10-06

## 2021-10-27 DIAGNOSIS — G43.709 CHRONIC MIGRAINE WITHOUT AURA WITHOUT STATUS MIGRAINOSUS, NOT INTRACTABLE: ICD-10-CM

## 2021-10-27 RX ORDER — QUETIAPINE FUMARATE 25 MG/1
TABLET, FILM COATED ORAL
Qty: 60 TABLET | Refills: 3 | Status: SHIPPED | OUTPATIENT
Start: 2021-10-27 | End: 2022-02-28

## 2021-10-27 RX ORDER — LAMOTRIGINE 100 MG/1
TABLET ORAL
Qty: 30 TABLET | Refills: 3 | Status: SHIPPED | OUTPATIENT
Start: 2021-10-27 | End: 2022-01-31 | Stop reason: SDUPTHER

## 2021-11-08 ENCOUNTER — HOSPITAL ENCOUNTER (OUTPATIENT)
Dept: INFUSION CENTER | Facility: CLINIC | Age: 61
End: 2021-11-08

## 2021-12-08 DIAGNOSIS — G43.709 CHRONIC MIGRAINE WITHOUT AURA WITHOUT STATUS MIGRAINOSUS, NOT INTRACTABLE: ICD-10-CM

## 2021-12-08 RX ORDER — PROCHLORPERAZINE MALEATE 10 MG
TABLET ORAL
Qty: 10 TABLET | Refills: 3 | Status: SHIPPED | OUTPATIENT
Start: 2021-12-08 | End: 2022-07-20 | Stop reason: SDUPTHER

## 2022-01-31 ENCOUNTER — OFFICE VISIT (OUTPATIENT)
Dept: NEUROLOGY | Facility: CLINIC | Age: 62
End: 2022-01-31
Payer: COMMERCIAL

## 2022-01-31 VITALS
SYSTOLIC BLOOD PRESSURE: 175 MMHG | TEMPERATURE: 97.7 F | BODY MASS INDEX: 25.01 KG/M2 | DIASTOLIC BLOOD PRESSURE: 90 MMHG | WEIGHT: 145.7 LBS | HEART RATE: 72 BPM

## 2022-01-31 DIAGNOSIS — F41.1 GENERALIZED ANXIETY DISORDER: ICD-10-CM

## 2022-01-31 DIAGNOSIS — I10 ESSENTIAL HYPERTENSION: Primary | ICD-10-CM

## 2022-01-31 DIAGNOSIS — G43.709 CHRONIC MIGRAINE WITHOUT AURA WITHOUT STATUS MIGRAINOSUS, NOT INTRACTABLE: ICD-10-CM

## 2022-01-31 PROCEDURE — 99215 OFFICE O/P EST HI 40 MIN: CPT | Performed by: PHYSICIAN ASSISTANT

## 2022-01-31 RX ORDER — RIMEGEPANT SULFATE 75 MG/75MG
75 TABLET, ORALLY DISINTEGRATING ORAL AS NEEDED
Qty: 8 TABLET | Refills: 3 | Status: SHIPPED | OUTPATIENT
Start: 2022-01-31 | End: 2022-07-16 | Stop reason: SDUPTHER

## 2022-01-31 RX ORDER — LAMOTRIGINE 100 MG/1
100 TABLET ORAL 2 TIMES DAILY
Qty: 180 TABLET | Refills: 3 | Status: SHIPPED | OUTPATIENT
Start: 2022-01-31

## 2022-01-31 NOTE — PATIENT INSTRUCTIONS
Chronic migraine headache without aura:    Preventive therapy:  - continue magnesium 500 mg and vitamin B2 100 mg 1 tab daily  - cyproheptadine 4 mg at bedtime for weather related headahces  - Topamax 50 mg in the morning and 100 mg at bedtime  -Lamictal 100 mg twice a day  - Seroquel 50mg at bedtime  - Vyepti 300 mg every 3 months  Continue propranolol per provider but talk to PCP about blood pressure  Continue meds for depression but talk to psychiatry about adjusting    Abortive therapy for migraine headaches:   - at the onset of a migraine headache she is to take Nurtec 75 mg  Limit of 1 in 24 hours  IF not alleviated may use Compazine 10 mg     (may use rizatriptan if Nurtec doesn't work)  If in 2 hours her headache has not improved she has take Decadron 1 mg  She may also take cyproheptadine 4 mg at bedtime that night to help with headaches  - for her mild headaches pain intensity between 1 and 4 she is taking Compazine 10 mg which does seem to abort the milder headache

## 2022-01-31 NOTE — PROGRESS NOTES
[facility]  PATIENT:  Rafael Hart  MRN:  [id number]  :  1960  DATE OF SERVICE:  2022      Assessment/Plan:     Headache, chronic migraine without aura  Preventive therapy:  - continue magnesium 500 mg and vitamin B2 100 mg 1 tab daily  - cyproheptadine 4 mg at bedtime for weather related headahces  - Topamax 50 mg in the morning and 100 mg at bedtime  -Lamictal 100 mg twice a day  - Seroquel 50mg at bedtime  - Vyepti 300 mg every 3 months  Continue propranolol per provider but talk to PCP about blood pressure  Continue meds for depression but talk to psychiatry about adjusting    Abortive therapy for migraine headaches:   - at the onset of a migraine headache she is to take Nurtec 75 mg  Limit of 1 in 24 hours  IF not alleviated may use Compazine 10 mg     (may use rizatriptan if Nurtec doesn't work)  If in 2 hours her headache has not improved she has take Decadron 1 mg  She may also take cyproheptadine 4 mg at bedtime that night to help with headaches  - for her mild headaches pain intensity between 1 and 4 she is taking Compazine 10 mg which does seem to abort the milder headache      Essential hypertension  Currently on propranolol  Discuss with PCP adding more medications to help decrease her pressure    Generalized anxiety disorder  Work with psychiatry and possibly change medications         Problem List Items Addressed This Visit        Cardiovascular and Mediastinum    Headache, chronic migraine without aura     Preventive therapy:  - continue magnesium 500 mg and vitamin B2 100 mg 1 tab daily  - cyproheptadine 4 mg at bedtime for weather related headahces  - Topamax 50 mg in the morning and 100 mg at bedtime  -Lamictal 100 mg twice a day  - Seroquel 50mg at bedtime  - Vyepti 300 mg every 3 months  Continue propranolol per provider but talk to PCP about blood pressure  Continue meds for depression but talk to psychiatry about adjusting    Abortive therapy for migraine headaches:   - at the onset of a migraine headache she is to take Nurtec 75 mg  Limit of 1 in 24 hours  IF not alleviated may use Compazine 10 mg     (may use rizatriptan if Nurtec doesn't work)  If in 2 hours her headache has not improved she has take Decadron 1 mg  She may also take cyproheptadine 4 mg at bedtime that night to help with headaches  - for her mild headaches pain intensity between 1 and 4 she is taking Compazine 10 mg which does seem to abort the milder headache  Relevant Medications    lamoTRIgine (LaMICtal) 100 mg tablet    Rimegepant Sulfate (Nurtec) 75 MG TBDP    Essential hypertension - Primary     Currently on propranolol  Discuss with PCP adding more medications to help decrease her pressure            Other    Generalized anxiety disorder     Work with psychiatry and possibly change medications                   History of Present Illness: We had the pleasure of evaluating Jerri Kumar in neurological follow up  today for headaches  As you know,  she is a 63 y o   right handed female  She is a  and is here today for evaluation of her headaches          Past medical history:  QTC: 411/25/2019 - 732   -GERD  - hypertension  - depression /anxiety     Depression/generalized anxiety/migraine update:   Since last seen patient states that due to COVID her anxiety is much worse   COVID has also exacerbated her headaches  Her Mom fell and broke her femur and then was hospitalized in December  Her mother moved to her sister's house in another state  Wasn't talking to her because she thought she was going to have her killed  Patient started to go to counseling over this  She has put her dad's house up for sale and is having problems with her dad's girlfriend  Can't get her injections anymore    Last Emgality injection was in June 2021        Patient also did not increase her lamictal dose in February nor stop the depakote        Internal tremor:   Pt states that after her flu vaccination in October 2019, she developed this internal tremor   Describes it as a sensation of internal shaking but it time has noted that her arms and whole body shakes as well       Onset: after the flu vaccination  Occurs: It tends to occurs 3-4 times a week  Lasts: all day or at time will go into the next day  Trigger: none  She was given hydroxyzine 25mg  by her PCP and per pt it did not help   She took it for few days intermittently      Chronic Migraine headaches :  What medications do you take or have you taken for your headaches/mood/BP?      Current Preventive:   vitamin D3, vitamin B12, magnesium, B2  cyproheptadine  Lamictal, Topamax  Propranolol  Venlafaxine, Seroquel, Clonazepam  Vyepti    Current Abortive:    rizatriptan  Prochlorperazine  Ketorolac   Dexamethasone     Prior Preventative:  - Daith earring: in place and did help in the beginning and then did not thus pt took it out  - Botox (x 2 with no help),  - Toprol 100 mg,   - Zoloft 100 mg, amitriptyline 10 mg  - olanzapine 5 mg, risperidone 1 mg,  - gabapentin, Depakote   - Benadryl 50 mg, hydroxyzine 25 mg,  - Robaxin 1 g,  - Emgality  Prior Abortive:   - Excedrin migraine,  - Imitrex  - DHE 1 mg (IV infusion -2018)  - Toradol 30/60 mg injection   - Prednisone (no headaches with steroids) she took it for a week,         What is your current pain? 2/10     How often do the headaches occur ? 2 to 3 a week;   Mild headache: 2-3 a week  Mod to severe: 8-12 or more     Are you ever headache free? At least 2 a week      Aura/warning prior to headache onset? none     What time of the day do the headaches start? Varies but primarily begin in the am     How long do the headaches last: all day?   Mild headaches:  all day  Moderate to severe headache:  Few hours with Maxalt as it works 80% of the time  Blake Anay this fails may last half of the day as she has to take the Decadron to aborted  Romulus Hammond if 10/10 with vomiting will last rest of the day     Where are they located? frontal, temporal and jaw pain     What is the intensity of pain? 8-10/10     Describe your usual headache? Throbbing, pressure, achy, shooting, stabbing     Associated symptoms:   -  nausea, vomiting  -  photophobia, phonophobia, sensitivity to smell  -  concentration problems  -  light-headed or dizzy  -  prefer to be alone and in a dark room      Number of days missed per month because of headaches:  Work (or school) days: rarely, since last seen missed 2 days  Social or Family activities: none     Headache trigger: Fatigue, Stress/Tension, chocolate and turkey  Have you used CBD or THC for your headaches and how often? No  Are you current pregnant or planning on getting pregnant? No     Have you ever had any Brain imaging? yes        12/27/17 MRI C spine    IMPRESSION:Mild spondylotic changes are similar to the previous study      12/27/17 MRI Brain  IMPRESSION:No acute infarction, intracranial hemorrhage or mass  I personally reviewed these images       Reviewed old notes from physician seen in the past- see above HPI for summary of previous encounters         Past Medical History:   Diagnosis Date    Anxiety     Depression     Disease of thyroid gland     History of transfusion     Hypertension     Migraine        Patient Active Problem List   Diagnosis    Cellulitis    Insomnia    Headache, chronic migraine without aura    Generalized anxiety disorder    Essential hypertension    GERD (gastroesophageal reflux disease)    Tremor       Medications:      Current Outpatient Medications   Medication Sig Dispense Refill    Cholecalciferol (VITAMIN D3 PO) Take 1 tablet by mouth daily      clonazePAM (KlonoPIN) 0 5 mg tablet Take 0 5 mg by mouth daily    0    cyanocobalamin (VITAMIN B-12) 1,000 mcg tablet Take 1,000 mcg by mouth daily        cyproheptadine (PERIACTIN) 4 mg tablet Take 1 tablet (4 mg total) by mouth daily (Patient taking differently: Take 4 mg by mouth as needed ) 30 tablet 0    dexamethasone (DECADRON) 1 mg tablet 1 at the onset of a severe headache with food 10 tablet 5    hydrOXYzine HCL (ATARAX) 25 mg tablet 1 tab at bedtime nightly 30 tablet 1    lamoTRIgine (LaMICtal) 100 mg tablet Take 1 tablet (100 mg total) by mouth 2 (two) times a day 180 tablet 3    Magnesium Oxide 250 MG TABS Take 1 tablet by mouth daily        omeprazole (PriLOSEC) 40 MG capsule Take 40 mg by mouth daily  0    prochlorperazine (COMPAZINE) 10 mg tablet TAKE 1 TABLET AT ONSET OF MIGRAINE, CAN REPEAT IN 8 HOURS, CAN TAKE WITH TRIPTAN/NSAID 10 tablet 3    propranolol (INNOPRAN XL) 120 MG 24 hr capsule Take 120 mg by mouth daily at bedtime      QUEtiapine (SEROquel) 25 mg tablet take 2 tablets by mouth at bedtime 60 tablet 3    Red Yeast Rice Extract (RED YEAST RICE PO) Take 1 tablet by mouth daily      Riboflavin (VITAMIN B-2) 100 MG TABS Take 1 tablet by mouth daily        rizatriptan (MAXALT-MLT) 10 MG disintegrating tablet Take 1 tablet (10 mg total) by mouth as needed for migraine May repeat in 2 hours if needed  Limit of 3 a week or 12 a month 12 tablet 5    topiramate (TOPAMAX) 50 MG tablet take 1 tablet by mouth every morning and 2 tablets at bedtime 90 tablet 6    triamcinolone (KENALOG) 0 1 % ointment Apply 1 application topically as needed   0    Rimegepant Sulfate (Nurtec) 75 MG TBDP Take 75 mg by mouth as needed (migraine) Limit of 1 in 24 hours 8 tablet 3     No current facility-administered medications for this visit  Allergies:       Allergies   Allergen Reactions    Ceftazidime      [facility]    Cefuroxime      [facility]  [facility]    Oxymetazoline      [facility]    Tetracycline Rash       Family History:     Family History   Problem Relation Age of Onset    Hypertension Mother     Bipolar disorder Family        Social History:     Social History     Socioeconomic History    Marital status: Single Spouse name: Not on file    Number of children: Not on file    Years of education: Not on file    Highest education level: Not on file   Occupational History    Not on file   Tobacco Use    Smoking status: Current Every Day Smoker     Packs/day: 0 25     Years: 10 00     Pack years: 2 50     Types: Cigarettes    Smokeless tobacco: Never Used    Tobacco comment: Pt  may be interested in future   Vaping Use    Vaping Use: Never used   Substance and Sexual Activity    Alcohol use: No    Drug use: No    Sexual activity: Yes     Partners: Male     Birth control/protection: Post-menopausal   Other Topics Concern    Not on file   Social History Narrative    Not on file     Social Determinants of Health     Financial Resource Strain: Not on file   Food Insecurity: Not on file   Transportation Needs: Not on file   Physical Activity: Not on file   Stress: Not on file   Social Connections: Not on file   Intimate Partner Violence: Not on file   Housing Stability: Not on file      I have reviewed the patient's medical, social and surgical history as well as medications in detail and updated the computerized patient record  Objective:   Physical Exam:                                                                   Vitals:            BP (!) 175/90 (BP Location: Left arm, Patient Position: Sitting, Cuff Size: Standard)   Pulse 72   Temp 97 7 °F (36 5 °C) (Temporal)   Wt 66 1 kg (145 lb 11 2 oz)   BMI 25 01 kg/m²   BP Readings from Last 3 Encounters:   01/31/22 (!) 175/90   08/16/21 132/89   07/28/21 143/89     Pulse Readings from Last 3 Encounters:   01/31/22 72   08/16/21 68   07/28/21 69          CONSTITUTIONAL: Well developed, well nourished, well groomed  No dysmorphic features  Eyes:  EOM normal      Neck:  Normal ROM, neck supple  HEENT:  Normocephalic atraumatic  Chest:  Respirations regular and unlabored      Psychiatric:  Normal behavior and appropriate affect      MENTAL STATUS  Orientation: Alert and oriented x 3  Fund of knowledge: Intact  MOTOR (Upper and lower extremities)   Bulk/tone/abnormal movement: Normal muscle bulk and tone  COORDINATION   Station/Gait: Normal baseline gait  Review of Systems:   Review of Systems  Constitutional: Negative  HENT: Negative  Eyes: Negative  Respiratory: Negative  Cardiovascular: Negative  Gastrointestinal: Negative  Endocrine: Negative  Genitourinary: Negative  Musculoskeletal: Negative  Skin: Negative  Allergic/Immunologic: Negative  Neurological: Positive for headaches  Hematological: Negative  Psychiatric/Behavioral: Negative        I personally reviewed the ROS entered by the MA    I spent 30 minutes in face-to-face discussion regarding  the pathophysiology of her current symptoms and further plan, as well as counseling, educating, and coordinating the patient's care including pathogenesis of diagnosis, prognosis of diagnosis, diagnostic results, impression, and recommendations, risks and benefits of treatment, instructions for disease self management, treatment instructions, follow up requirements and risk factors and risk reduction of disease and spent 15 minutes non-face to face    Author:  Xena Mendoza PA-C 1/31/2022 10:39 AM

## 2022-01-31 NOTE — ASSESSMENT & PLAN NOTE
Preventive therapy:  - continue magnesium 500 mg and vitamin B2 100 mg 1 tab daily  - cyproheptadine 4 mg at bedtime for weather related headahces  - Topamax 50 mg in the morning and 100 mg at bedtime  -Lamictal 100 mg twice a day  - Seroquel 50mg at bedtime  - Vyepti 300 mg every 3 months  Continue propranolol per provider but talk to PCP about blood pressure  Continue meds for depression but talk to psychiatry about adjusting    Abortive therapy for migraine headaches:   - at the onset of a migraine headache she is to take Nurtec 75 mg  Limit of 1 in 24 hours  IF not alleviated may use Compazine 10 mg     (may use rizatriptan if Nurtec doesn't work)  If in 2 hours her headache has not improved she has take Decadron 1 mg  She may also take cyproheptadine 4 mg at bedtime that night to help with headaches  - for her mild headaches pain intensity between 1 and 4 she is taking Compazine 10 mg which does seem to abort the milder headache

## 2022-02-03 ENCOUNTER — TELEPHONE (OUTPATIENT)
Dept: NEUROLOGY | Facility: CLINIC | Age: 62
End: 2022-02-03

## 2022-02-03 NOTE — TELEPHONE ENCOUNTER
NATYI-  recevied TT from daniella steve with [facility]-  Jayy Leblancanastacia Cookia Lis  [birth year] is due for vyepti infusion next week  She is home infused  I spoke with her today- her insurance remains the same however her coin durable and out of pocket limit increased drastically ( her infusion out of pocket cost will be about 2700 00) for the next dose  She stated she cannot afford this  I gave her info for vyepti copay assist program  She is going to look into that    At present time , she refused infusion due to cost  Dionne Kaplan [phone]

## 2022-02-07 ENCOUNTER — TELEPHONE (OUTPATIENT)
Dept: NEUROLOGY | Facility: CLINIC | Age: 62
End: 2022-02-07

## 2022-02-07 NOTE — TELEPHONE ENCOUNTER
MSW phoned Chelsey Nuñez at [phone] and she reported that she called the patient for over three weeks not returning calls- home care notified her that  infusion was suppose to be scheduled for this week  Patient was not communicating with her  Patient finally called her back last week and she mailed her info for Copay program for Vyepti , also informed patient that she can complete form online  Unfortunately, can't get on the Internet to complete form  Patient reported that in November - everything was covered but she was informed that deductible was already met by then  If patient uses the copay program then she will just owe $3500 as copay card will cover $4,000    Annmarie Monge reported that patient can also call Melissa Guallpa connect enroll in their copay program  It is not based on income, require commercial  Currently they have an [email or web address] on file  Patient's out of pocket increased dramatically  Infusions are provide by [address] home infusion  Melissa Guallpa is billed through medical    $7500 out of pocket   Cap for Copay assistance is 4,000 year   Patient will still have another $3,000  Patient told daniella that Melissa Captain is not really helping  Patient has received only 2 doses so far every 3 months   With the copay card patient is reponsible for : $5 additionally will have to pay 100-$125 - out of pocket for nurse     Cost of Vyepti  $1500 per vial, patient gets 3 vials   With the assistance patient will meet the cap of $4000 with 2 infusions  (will meet cap In May) then will have total of 4 infusions  Once patient is approved for the copay assistance she must submit  EOB and Billing     For insurance verification enrollment form - MSW will complete once having authorization from patient  A vannessa was left to her phone awaiting on a call back  [email or web address]  pdf    [facility]   Administration side   [id number] NP  Tax id [id number]  [address] [facility] [address]  [phone]  contact Mitul Fischer   fulfillment method :buy and bill   Anticipated infusion date- date it is filled out  Fax enrollment form

## 2022-02-07 NOTE — TELEPHONE ENCOUNTER
MSW phoned patient who reported that she is not even trying to get Vyepti this week as she has not been feeling well  MSW asked if she can start the Verification Enrollment form for the copay assistance patient provided verbal consent for MSW to complete and submit  Again, patient reported that she is not interested in applying this week as she is not feeling well  Patient reported that she has not been eating since Friday  When she tried to eat, nothing would stay and she would throw up  She has a nagging headache, and sweating cold  Today she was able to just eat french fries  She reported that she believes it may be covid  MSW recommended to contact her PCP to request an order for testing and discuss her symptoms and if not feeling well to visit her local ER  Patient reported understanding   MSW informed patient that she will notify [place], [place]

## 2022-02-08 NOTE — TELEPHONE ENCOUNTER
esabin [id number]  Group [id number]  [facility] [id number]  ID [id number]    PA initiated on [facility]    Awaiting determination

## 2022-02-08 NOTE — TELEPHONE ENCOUNTER
Insurance Verification Enrollment Form from [employer] was sent to [place] who will provide to Caprice to review and sign if appropriate

## 2022-02-08 NOTE — TELEPHONE ENCOUNTER
Spoke to pt, she has not been tested for COVID, she will call PCP now and try ot get tested today     Right now she doesn't have a headache

## 2022-02-09 NOTE — TELEPHONE ENCOUNTER
Insurance verification form for Vyepti connect was  faxed to [phone] multiple tries all unsuccessful

## 2022-02-10 NOTE — TELEPHONE ENCOUNTER
MSW phoned [employer] at [phone] and spoke with Representative Sejal Hull who reported that insurance verification form to be completed, then patient can call or submit application for copay assistance  MSW called Vyepti connect program option 1 and spoke with [employer] and requested alternative fax number  Alternative fax not available but MSW can e-mail enrollment form to:    [email or web address]      Form was successfully emailed

## 2022-02-11 NOTE — TELEPHONE ENCOUNTER
[facility] approved from 1/1/22-2/8/23    Approval letter faxed to [facility]

## 2022-02-14 NOTE — TELEPHONE ENCOUNTER
MSW phoned [employer] at [phone] and spoke with Representative Christina Tang who reported that email has not been attached yet to patient's account

## 2022-02-15 NOTE — TELEPHONE ENCOUNTER
Insurance verification form for Vyepti connect was successfully faxed to [phone]  MSW will follow up with [facility] [place] tomorrow

## 2022-02-15 NOTE — TELEPHONE ENCOUNTER
[facility]  [phone]    MSW phoned Charmaine  from [facility]  She reported that on 2/11 she faxed to office the Preliminatry Benefit Summary  MSW confirmed that it was received  She will send to office in the next few days the final Benefit Summary   Once this form is obtained then the patient can call them to apply for the copay program option 3

## 2022-02-16 ENCOUNTER — OFFICE VISIT (OUTPATIENT)
Dept: NEUROLOGY | Facility: CLINIC | Age: 62
End: 2022-02-16
Payer: COMMERCIAL

## 2022-02-16 VITALS
WEIGHT: 146 LBS | DIASTOLIC BLOOD PRESSURE: 80 MMHG | SYSTOLIC BLOOD PRESSURE: 140 MMHG | HEART RATE: 81 BPM | BODY MASS INDEX: 24.92 KG/M2 | TEMPERATURE: 96.5 F | HEIGHT: 64 IN

## 2022-02-16 DIAGNOSIS — R25.1 TREMOR: Primary | ICD-10-CM

## 2022-02-16 PROCEDURE — 99213 OFFICE O/P EST LOW 20 MIN: CPT | Performed by: PHYSICIAN ASSISTANT

## 2022-02-16 NOTE — TELEPHONE ENCOUNTER
February 16, 2022    LUIS Estrella  to [facility]  [place], [place]    YO      9:15 AM  Nursing team,   Please see under media the preliminary benefit summary  Candido Ayala may be able to provide copay assistance however a prior auth is required  If appropriate, could you please initiate the PA?    Thank you,   Vanessa Barton

## 2022-02-16 NOTE — TELEPHONE ENCOUNTER
MSW phoned CM from [address] [place] [address] at [phone]  And informed that pt has an approval on file from 11/1/21-11/1/22 through [facility]-[id number]    Ruben Hilton Requested approval to be faxed to [phone]   Case #[id number]  MSW successfully faxed Approval

## 2022-02-16 NOTE — TELEPHONE ENCOUNTER
Called daniella at [facility] at ext [phone], she has an approval on file from 11/1/21-11/1/22 through [employer]  [id number]  She was able to take a screen shot of approval and will fax it to CV office        Fax received, placed at  to be scanned into nirav mcadams-Please call daniella once you have an update

## 2022-02-16 NOTE — PATIENT INSTRUCTIONS
Patient with improvement of her tremors after switching her metoprolol to propranolol  Her tremors had been very well controlled until her recent Covid infection  She was also recently noted to have high blood pressure at her previous visit in our office  Because of this I have asked her to start monitoring her blood pressure at home once a day for the next week  If these numbers remain elevated and her tremors continue to be an issue then I would certainly suggest speaking to her PCP about increasing her propanolol dose further  If however her blood pressure is well controlled at home but her tremors continue we could also still consider increasing the propanolol dose further given this was beneficial for her when started  For right now she will remain on her current propanolol dose  She also does continue to take topiramate for her migraines

## 2022-02-18 NOTE — TELEPHONE ENCOUNTER
MSW phoned CM from [address] [place] [address] at [phone] and   spoke with Terrance Da Silva who informed that the Benefit investigation was completed  They were able to confirm that patient has a commercial insurance  Patient to call [phone] option 3 to enroll over the phone in copay program  Once she enrolls in the copay program, they will explain to her the process that she will have to follow in order for them to provide financial assistance for Vyepti  Case #[id number]    MSW phoned patient at [phone]  Already applied for the copay program over the phone she is not sure yet if moving forward with Vyepti as they will only assist with $4000  She would like to know if Mickie Puga has other recommendations for her in terms of medications that she can explore that may be more affordable for her  MSW phoned Pranay Hallman from [address] at [phone] and informed that patient is unsure if continuing with Vyepti and would like to learn if Provider has other recommendations for her  Pranay Hallman reported that will keep the orders opened for another month until she hears back from patient or Neuro office

## 2022-02-26 DIAGNOSIS — G43.709 CHRONIC MIGRAINE WITHOUT AURA WITHOUT STATUS MIGRAINOSUS, NOT INTRACTABLE: ICD-10-CM

## 2022-02-28 RX ORDER — QUETIAPINE FUMARATE 25 MG/1
TABLET, FILM COATED ORAL
Qty: 60 TABLET | Refills: 3 | Status: SHIPPED | OUTPATIENT
Start: 2022-02-28 | End: 2022-07-11

## 2022-02-28 NOTE — TELEPHONE ENCOUNTER
I called patient to clarify/discuss medications tried including emgality as does not appear vyepti will be affordable  Reached vm, left detailed message and requested a call back

## 2022-03-04 NOTE — TELEPHONE ENCOUNTER
I called patient to discuss emgality, she said someone was administering the shots for her but that is no longer an option; she is not sure if she could do herself  Connection was very poor; she said she was visiting her daughter in [place] and will call back when she is home late next week to discuss

## 2022-03-18 NOTE — TELEPHONE ENCOUNTER
MSW phoned patient and lvm, calling to follow up regarding conversation with Serenity Solorzano RN regarding [facility]   Please call back office at [phone]

## 2022-03-20 DIAGNOSIS — G43.709 CHRONIC MIGRAINE WITHOUT AURA WITHOUT STATUS MIGRAINOSUS, NOT INTRACTABLE: ICD-10-CM

## 2022-03-21 ENCOUNTER — TELEPHONE (OUTPATIENT)
Dept: NEUROLOGY | Facility: CLINIC | Age: 62
End: 2022-03-21

## 2022-03-21 DIAGNOSIS — G43.709 CHRONIC MIGRAINE WITHOUT AURA WITHOUT STATUS MIGRAINOSUS, NOT INTRACTABLE: Primary | ICD-10-CM

## 2022-03-21 RX ORDER — TOPIRAMATE 50 MG/1
TABLET, FILM COATED ORAL
Qty: 90 TABLET | Refills: 6 | Status: SHIPPED | OUTPATIENT
Start: 2022-03-21

## 2022-03-21 NOTE — TELEPHONE ENCOUNTER
Pt called and state that she was talking to Pat Parker about restarting emgality  She was taking emgality about a year ago  But this was discontinued bec she decided to try Vyepti  Her insurance will not cover Vyepti bec she has not met her deductible  Pt would like to try Emgality again  Requesting script be sent to [facility]  Prior emgality, she gets about >15 migraines per month  While on it, 3-4 migraines per month  Requesting script be sent to [facility] on file    Rx entered   Pls review and sign of      thanks                [phone] ok to leave detailed message

## 2022-03-21 NOTE — TELEPHONE ENCOUNTER
PA initiated on CMM  Key: [id number]  Message from plan:  New(Not sent to plan)  Member Inquiry service failure or member validation failure  Key# deleted    Created new PA on CMM  Key: [id number]  Message from plan: Additional Information Required  Please contact the members plan for prior authorization request     Called PA dept at [phone], spoke w/ Mirian Perera     PA (loading and maintenance dose) initiated via phone, Case # [id number]  Turnaround time: 72 hours       Awaiting determination

## 2022-03-21 NOTE — TELEPHONE ENCOUNTER
[facility] pharmacy left a message today at 1:30  Emgality requires PA  ID# [id number]  Can call PA at [phone]      Will initiate PA

## 2022-03-24 NOTE — TELEPHONE ENCOUNTER
PA has been denied  Called PA dept, spoke w/ Ruth and states that denied bec pt is taking Nurtec  Advised Nurtec is abortive med and Emgality is a preventative med  Next option would be to initiate an appeal    Appeal initiated via phone   [id number]    Turnaround 72 hours      Awaiting determination

## 2022-03-25 NOTE — TELEPHONE ENCOUNTER
Pharm left vm asking for update on PA for emgality  Determination not received yet  Left message for pharm making htem aware that appeal was submitted and pending and that we would call them with determination

## 2022-03-28 NOTE — TELEPHONE ENCOUNTER
PA has been approved through 9/25/2022    [facility] pharmacy, spoke w/ Katerin Mart and advised of the approval  States that pharmacist is still getting a rejection  Called PA dept at [phone], spoke w/ Carina and advised of the above  States that ins only approved 1 pen per 30 days (maintenance)  Need to initiate PA for the loading dose  Loading dose PA initiated via phone  [facility][id number]  Turnaround time 24-72 hours       Awaiting determination

## 2022-03-30 NOTE — TELEPHONE ENCOUNTER
Called PA dept to check status  Spoke w/ Montez Pascal and states that loading dose has been approved as well, good through 4/26/22  [facility] pharmacy, spoke w/ Cherl Fothergill and made aware of the approvals  She got a paid claim for the loading dose  She will contact this pt when the med is ready for

## 2022-07-11 DIAGNOSIS — G43.709 CHRONIC MIGRAINE WITHOUT AURA WITHOUT STATUS MIGRAINOSUS, NOT INTRACTABLE: ICD-10-CM

## 2022-07-11 RX ORDER — QUETIAPINE FUMARATE 25 MG/1
TABLET, FILM COATED ORAL
Qty: 60 TABLET | Refills: 3 | Status: SHIPPED | OUTPATIENT
Start: 2022-07-11

## 2022-07-16 DIAGNOSIS — G43.709 CHRONIC MIGRAINE WITHOUT AURA WITHOUT STATUS MIGRAINOSUS, NOT INTRACTABLE: ICD-10-CM

## 2022-07-18 RX ORDER — RIMEGEPANT SULFATE 75 MG/75MG
75 TABLET, ORALLY DISINTEGRATING ORAL AS NEEDED
Qty: 8 TABLET | Refills: 11 | Status: SHIPPED | OUTPATIENT
Start: 2022-07-18

## 2022-07-20 ENCOUNTER — TELEPHONE (OUTPATIENT)
Dept: NEUROLOGY | Facility: CLINIC | Age: 62
End: 2022-07-20

## 2022-07-20 ENCOUNTER — OFFICE VISIT (OUTPATIENT)
Dept: NEUROLOGY | Facility: CLINIC | Age: 62
End: 2022-07-20
Payer: COMMERCIAL

## 2022-07-20 VITALS
HEART RATE: 76 BPM | DIASTOLIC BLOOD PRESSURE: 96 MMHG | WEIGHT: 147.4 LBS | BODY MASS INDEX: 25.16 KG/M2 | SYSTOLIC BLOOD PRESSURE: 165 MMHG | TEMPERATURE: 96.1 F | HEIGHT: 64 IN

## 2022-07-20 DIAGNOSIS — G43.009 MIGRAINE WITHOUT AURA AND WITHOUT STATUS MIGRAINOSUS, NOT INTRACTABLE: Primary | ICD-10-CM

## 2022-07-20 DIAGNOSIS — G43.709 CHRONIC MIGRAINE WITHOUT AURA WITHOUT STATUS MIGRAINOSUS, NOT INTRACTABLE: ICD-10-CM

## 2022-07-20 PROCEDURE — 99215 OFFICE O/P EST HI 40 MIN: CPT | Performed by: PHYSICIAN ASSISTANT

## 2022-07-20 RX ORDER — VERAPAMIL HYDROCHLORIDE 40 MG/1
40 TABLET ORAL 3 TIMES DAILY
Qty: 60 TABLET | Refills: 3 | Status: SHIPPED | OUTPATIENT
Start: 2022-07-20 | End: 2022-10-17

## 2022-07-20 RX ORDER — ATOGEPANT 60 MG/1
60 TABLET ORAL DAILY
Qty: 30 TABLET | Refills: 11 | Status: SHIPPED | OUTPATIENT
Start: 2022-07-20

## 2022-07-20 RX ORDER — RIZATRIPTAN BENZOATE 10 MG/1
10 TABLET, ORALLY DISINTEGRATING ORAL AS NEEDED
Qty: 12 TABLET | Refills: 6 | Status: SHIPPED | OUTPATIENT
Start: 2022-07-20

## 2022-07-20 RX ORDER — PROCHLORPERAZINE MALEATE 10 MG
TABLET ORAL
Qty: 10 TABLET | Refills: 6 | Status: SHIPPED | OUTPATIENT
Start: 2022-07-20

## 2022-07-20 NOTE — TELEPHONE ENCOUNTER
Received vm from pharm that PA is needed for [facility]  States that depakote, propranolol and topamax at [facility] meds      [id number]  [phone]    Will need to complete PA

## 2022-07-20 NOTE — PATIENT INSTRUCTIONS
Chronic migraine headache without aura:    Preventive therapy:  - continue magnesium 500 mg and vitamin B2 100 mg 1 tab daily  - cyproheptadine 4 mg at bedtime for weather related headahces  - Topamax 50 mg in the morning and 100 mg at bedtime  -Lamictal 100 mg twice a day  - Seroquel 25 mg at bedtime  - Start Verapamil 40 mg at bedtime for 3 nights then increase to twice a day  Message me in 1-2 weeks after you start  If blood pressure is still on the higher side and still having headaches as frequently, we will increase  Please talk to PCP about blood pressure  Continue meds for depression   - Start Qulipta 60 mg daily    Abortive therapy for migraine headaches:   - at the onset of a migraine headache she is to take Nurtec 75 mg  Limit of 1 in 24 hours  IF not alleviated may use Compazine 10 mg     (may use rizatriptan if Nurtec doesn't work)  If in 2 hours her headache has not improved she has take Decadron 1 mg  She may also take cyproheptadine 4 mg at bedtime that night to help with headaches  - for her mild headaches pain intensity between 1 and 4 she is taking Compazine 10 mg which does seem to abort the milder headache    May use rizatriptan 10 mg but less than 3 doses a week

## 2022-07-20 NOTE — PROGRESS NOTES
[facility]  PATIENT:  Binta Jimenez  MRN:  [id number]  :  1960  DATE OF SERVICE:  2022      Assessment/Plan:     Headache, chronic migraine without aura    Chronic migraine headache without aura:    Preventive therapy:  - continue magnesium 500 mg and vitamin B2 100 mg 1 tab daily  - cyproheptadine 4 mg at bedtime for weather related headahces  - Topamax 50 mg in the morning and 100 mg at bedtime  -Lamictal 100 mg twice a day  - Seroquel 25 mg at bedtime  - Start Verapamil 40 mg at bedtime for 3 nights then increase to twice a day  Message me in 1-2 weeks after you start  If blood pressure is still on the higher side and still having headaches as frequently, we will increase  Please talk to PCP about blood pressure  Continue meds for depression   - Start Qulipta 60 mg daily    Abortive therapy for migraine headaches:   - at the onset of a migraine headache she is to take Nurtec 75 mg  Limit of 1 in 24 hours  IF not alleviated may use Compazine 10 mg     (may use rizatriptan if Nurtec doesn't work)  If in 2 hours her headache has not improved she has take Decadron 1 mg  She may also take cyproheptadine 4 mg at bedtime that night to help with headaches  - for her mild headaches pain intensity between 1 and 4 she is taking Compazine 10 mg which does seem to abort the milder headache  May use rizatriptan 10 mg but less than 3 doses a week         Problem List Items Addressed This Visit        Cardiovascular and Mediastinum    Headache, chronic migraine without aura       Chronic migraine headache without aura:    Preventive therapy:  - continue magnesium 500 mg and vitamin B2 100 mg 1 tab daily  - cyproheptadine 4 mg at bedtime for weather related headahces  - Topamax 50 mg in the morning and 100 mg at bedtime  -Lamictal 100 mg twice a day  - Seroquel 25 mg at bedtime  - Start Verapamil 40 mg at bedtime for 3 nights then increase to twice a day    Message me in 1-2 weeks after you start  If blood pressure is still on the higher side and still having headaches as frequently, we will increase  Please talk to PCP about blood pressure  Continue meds for depression   - Start Qulipta 60 mg daily    Abortive therapy for migraine headaches:   - at the onset of a migraine headache she is to take Nurtec 75 mg  Limit of 1 in 24 hours  IF not alleviated may use Compazine 10 mg     (may use rizatriptan if Nurtec doesn't work)  If in 2 hours her headache has not improved she has take Decadron 1 mg  She may also take cyproheptadine 4 mg at bedtime that night to help with headaches  - for her mild headaches pain intensity between 1 and 4 she is taking Compazine 10 mg which does seem to abort the milder headache  May use rizatriptan 10 mg but less than 3 doses a week         Relevant Medications    Atogepant (Qulipta) 60 MG TABS    verapamil (CALAN) 40 mg tablet    rizatriptan (MAXALT-MLT) 10 mg disintegrating tablet    prochlorperazine (COMPAZINE) 10 mg tablet      Other Visit Diagnoses     Migraine without aura and without status migrainosus, not intractable    -  Primary    Relevant Medications    Atogepant (Qulipta) 60 MG TABS    verapamil (CALAN) 40 mg tablet    rizatriptan (MAXALT-MLT) 10 mg disintegrating tablet              History of Present Illness: We had the pleasure of evaluating Balwinder Cool in neurological follow up  today for headaches  As you know,  she is a 63 y o   right handed female   She is a  and is here today for evaluation of her headaches          Past medical history:  QTC: 411/25/2019 - 408   -GERD  - hypertension  - depression /anxiety     Depression/generalized anxiety/migraine update:   Since last seen patient states that due to [place] her anxiety is much worse  Thais Grey has also exacerbated her headaches  Her Mom fell and broke her femur and [facility] moved to her sister's house in another state   Wasn't talking to her because she thought she was going to have her killed  Denisse Masker started to go to counseling over this  Caroline Peterson has put her dad's house up for sale and is having problems with her dad's girlfriend  Wanda Ferrari get her injections anymore   Last Emgality injection was in June 2021       Couldn't continue the Emgality due to injection pain and discomfort     Internal tremor:   Pt states that after her flu vaccination in October 2019, she developed this internal tremor   Describes it as a sensation of internal shaking but it time has noted that her arms and whole body shakes as well       Onset: after the flu vaccination  Occurs: It tends to occurs 3-4 times a week  Lasts: all day or at time will go into the next day  Trigger: none  She was given hydroxyzine 25mg  by her PCP and per pt it did not help   She took it for few days intermittently      Chronic Migraine headaches :  What medications do you take or have you taken for your headaches/mood/BP?      Current Preventive:   vitamin D3, vitamin B12, magnesium, B2  cyproheptadine  Lamictal, Topamax  Seroquel, Clonazepam, Atarax  Propranolol  Emgality     Current Abortive:    rizatriptan  Prochlorperazine  Ketorolac   Dexamethasone     Prior Preventative:  - Daith earring: in place and did help in the beginning and then did not thus pt took it out  - Botox (x 2 with no help),  - Toprol 100 mg, Propranolol  - Zoloft 100 mg, amitriptyline 10 mg, Venlafaxine,   - olanzapine 5 mg, risperidone 1 mg,  - gabapentin, Depakote   - Benadryl 50 mg, hydroxyzine 25 mg,  - Robaxin 1 g,  Vyepti  Prior Abortive:   - Excedrin migraine,  - Imitrex  - DHE 1 mg (IV infusion -2018)  - Toradol 30/60 mg injection   - Prednisone (no headaches with steroids) she took it for a week,         What is your current pain? 0/10     How often do the headaches occur ? 2 to 3 a week;   Mild headache: 2-3 a week  Mod to severe: 8-12 or more     Are you ever headache free? At least 2 a week      Aura/warning prior to headache onset? none     What time of the day do the headaches start? Varies but primarily begin in the am     How long do the headaches last: all day?   Mild headaches:  all day  Moderate to severe headache:  Few hours with Maxalt as it works 80% of the time  Doris Francisco this fails may last half of the day as she has to take the Decadron to aborted  Kale  if 10/10 with vomiting will last rest of the day     Where are they located? frontal, temporal and jaw pain     What is the intensity of pain? 8-10/10     Describe your usual headache? Throbbing, pressure, achy, shooting, stabbing     Associated symptoms:   -  nausea, vomiting  -  photophobia, phonophobia, sensitivity to smell  -  concentration problems  -  light-headed or dizzy  -  prefer to be alone and in a dark room      Number of days missed per month because of headaches:  Work (or school) days:  Missed 2 days last week  Social or Family activities: none     Headache trigger: Fatigue, Stress/Tension, chocolate and turkey  Have you used CBD or THC for your headaches and how often? No  Are you current pregnant or planning on getting pregnant? No     Have you ever had any Brain imaging? yes        12/27/17 MRI C spine    IMPRESSION:Mild spondylotic changes are similar to the previous study      12/27/17 MRI Brain  IMPRESSION:No acute infarction, intracranial hemorrhage or mass    I personally reviewed these images       Reviewed old notes from physician seen in the past- see above HPI for summary of previous encounters         Past Medical History:   Diagnosis Date    Anxiety     Depression     Disease of thyroid gland     History of transfusion     Hypertension     Migraine        Patient Active Problem List   Diagnosis    Cellulitis    Insomnia    Headache, chronic migraine without aura    Generalized anxiety disorder    Essential hypertension    GERD (gastroesophageal reflux disease)    Tremor       Medications: Current Outpatient Medications   Medication Sig Dispense Refill    Atogepant (Qulipta) 60 MG TABS Take 60 mg by mouth in the morning 30 tablet 11    Cholecalciferol (VITAMIN D3 PO) Take 1 tablet by mouth daily      clonazePAM (KlonoPIN) 0 5 mg tablet Take 0 5 mg by mouth daily    0    cyanocobalamin (VITAMIN B-12) 1,000 mcg tablet Take 1,000 mcg by mouth daily        cyproheptadine (PERIACTIN) 4 mg tablet Take 1 tablet (4 mg total) by mouth daily (Patient taking differently: Take 4 mg by mouth as needed) 30 tablet 0    dexamethasone (DECADRON) 1 mg tablet 1 at the onset of a severe headache with food 10 tablet 5    hydrOXYzine HCL (ATARAX) 25 mg tablet 1 tab at bedtime nightly 30 tablet 1    lamoTRIgine (LaMICtal) 100 mg tablet Take 1 tablet (100 mg total) by mouth 2 (two) times a day 180 tablet 3    Magnesium Oxide 250 MG TABS Take 1 tablet by mouth daily        omeprazole (PriLOSEC) 40 MG capsule Take 40 mg by mouth daily  0    prochlorperazine (COMPAZINE) 10 mg tablet TAKE 1 TABLET AT ONSET OF MIGRAINE, CAN REPEAT IN 8 HOURS, CAN TAKE WITH TRIPTAN/NSAID 10 tablet 6    propranolol (INNOPRAN XL) 120 MG 24 hr capsule Take 120 mg by mouth daily at bedtime      QUEtiapine (SEROquel) 25 mg tablet take 2 tablets by mouth at bedtime 60 tablet 3    Red Yeast Rice Extract (RED YEAST RICE PO) Take 1 tablet by mouth daily      Riboflavin (VITAMIN B-2) 100 MG TABS Take 1 tablet by mouth daily        Rimegepant Sulfate (Nurtec) 75 MG TBDP Take 75 mg by mouth as needed (migraine) Limit of 1 in 24 hours 8 tablet 11    rizatriptan (MAXALT-MLT) 10 mg disintegrating tablet Take 1 tablet (10 mg total) by mouth as needed for migraine May repeat in 2 hours if needed    Limit of 3 a week or 12 a month 12 tablet 6    topiramate (TOPAMAX) 50 MG tablet take 1 tablet by mouth every morning and 2 tablets at bedtime 90 tablet 6    triamcinolone (KENALOG) 0 1 % ointment Apply 1 application topically as needed   0    verapamil (CALAN) 40 mg tablet Take 1 tablet (40 mg total) by mouth 3 (three) times a day 60 tablet 3     No current facility-administered medications for this visit  Allergies: Allergies   Allergen Reactions    Ceftazidime      [facility]    Cefuroxime      [facility]    Oxymetazoline      [facility]    Tetracycline Rash       Family History:     Family History   Problem Relation Age of Onset    Hypertension Mother     Bipolar disorder Family        Social History:     Social History     Socioeconomic History    Marital status: Single     Spouse name: Not on file    Number of children: Not on file    Years of education: Not on file    Highest education level: Not on file   Occupational History    Not on file   Tobacco Use    Smoking status: Current Every Day Smoker     Packs/day: 0 25     Years: 10 00     Pack years: 2 50     Types: Cigarettes    Smokeless tobacco: Never Used    Tobacco comment: Pt  may be interested in future   Vaping Use    Vaping Use: Never used   Substance and Sexual Activity    Alcohol use: No    Drug use: No    Sexual activity: Yes     Partners: Male     Birth control/protection: Post-menopausal   Other Topics Concern    Not on file   Social History Narrative    Not on file     Social Determinants of Health     Financial Resource Strain: Not on file   Food Insecurity: Not on file   Transportation Needs: Not on file   Physical Activity: Not on file   Stress: Not on file   Social Connections: Not on file   Intimate Partner Violence: Not on file   Housing Stability: Not on file      I have reviewed the patient's medical, social and surgical history as well as medications in detail and updated the computerized patient record        Objective:   Physical Exam:                                                                   Vitals:            /96 (BP Location: Left arm, Patient Position: Sitting, Cuff Size: Standard) Pulse 76   Temp (!) 96 1 °F (35 6 °C) (Temporal)   Ht 5' 4" (1 626 m)   Wt 66 9 kg (147 lb 6 4 oz)   BMI 25 30 kg/m²   BP Readings from Last 3 Encounters:   07/20/22 165/96   02/16/22 140/80   01/31/22 (!) 175/90     Pulse Readings from Last 3 Encounters:   07/20/22 76   02/16/22 81   01/31/22 72            CONSTITUTIONAL: Well developed, well nourished, well groomed  No dysmorphic features  Eyes:  EOM normal      Neck:  Normal ROM, neck supple  HEENT:  Normocephalic atraumatic  Chest:  Respirations regular and unlabored  Psychiatric:  Normal behavior and appropriate affect      MENTAL STATUS  Orientation: Alert and oriented x 3  Fund of knowledge: Intact  MOTOR (Upper and lower extremities)   Bulk/tone/abnormal movement: Normal muscle bulk and tone  COORDINATION   Station/Gait: Normal baseline gait  Review of Systems:   Review of Systems  Constitutional: Negative  HENT: Negative  Eyes: Negative  Respiratory: Negative  Cardiovascular: Negative  Gastrointestinal: Negative  Endocrine: Negative  Genitourinary: Negative  Musculoskeletal: Negative  Skin: Negative  Allergic/Immunologic: Negative  Neurological: Positive for headaches  Hematological: Negative  Psychiatric/Behavioral: Negative      I personally reviewed the ROS entered by the MA    I spent 42 minutes of total time for this visit  Author:  Alex Escobedo PA-C 7/20/2022 11:30 AM

## 2022-07-22 NOTE — TELEPHONE ENCOUNTER
Pharm left vm checking status of PA   [phone]    Left message for pharm making them aware that this was submitted today and pending and that we can call with update when available

## 2022-08-01 NOTE — TELEPHONE ENCOUNTER
pt left message stating that pharm needs PA for Pardeep Ramirez and asking for a call back  [phone]    qulipta denied as they need documentation that pt has episodic migraines (4-14 headache days per month) must be provided    per office note-  How often do the headaches occur ? 2 to 3 a week    called insurance at [phone] and spoke to johanne  Made her aware  That pt does have episodic migraines  Expedited appeal started  Ref #-[id number]  Will receive determination by   8/4/22  Pt made aware of above  Advised that we can call her back with determination once received     [phone] to leave detailed message

## 2022-08-04 NOTE — TELEPHONE ENCOUNTER
Called PA dept to check status  Spoke w/Shiloh and states that appeal has been approved through 2/3/2023    [facility] pharmacy, spoke w/pharmacist Jose and made aware of the approval  Claim was processed successfully w/ $ 0 copay  Pt will get a notification when the med is ready for        Pt made aware

## 2022-10-02 DIAGNOSIS — G43.709 CHRONIC MIGRAINE WITHOUT AURA WITHOUT STATUS MIGRAINOSUS, NOT INTRACTABLE: ICD-10-CM

## 2022-10-03 RX ORDER — DEXAMETHASONE 1 MG
TABLET ORAL
Qty: 10 TABLET | Refills: 5 | Status: SHIPPED | OUTPATIENT
Start: 2022-10-03

## 2022-10-15 DIAGNOSIS — G43.009 MIGRAINE WITHOUT AURA AND WITHOUT STATUS MIGRAINOSUS, NOT INTRACTABLE: ICD-10-CM

## 2022-10-17 RX ORDER — VERAPAMIL HYDROCHLORIDE 40 MG/1
TABLET ORAL
Qty: 60 TABLET | Refills: 3 | Status: SHIPPED | OUTPATIENT
Start: 2022-10-17

## 2022-10-23 DIAGNOSIS — G43.709 CHRONIC MIGRAINE WITHOUT AURA WITHOUT STATUS MIGRAINOSUS, NOT INTRACTABLE: ICD-10-CM

## 2022-10-24 RX ORDER — TOPIRAMATE 50 MG/1
TABLET, FILM COATED ORAL
Qty: 90 TABLET | Refills: 6 | Status: SHIPPED | OUTPATIENT
Start: 2022-10-24

## 2022-11-21 DIAGNOSIS — G43.709 CHRONIC MIGRAINE WITHOUT AURA WITHOUT STATUS MIGRAINOSUS, NOT INTRACTABLE: ICD-10-CM

## 2022-11-21 RX ORDER — QUETIAPINE FUMARATE 25 MG/1
TABLET, FILM COATED ORAL
Qty: 60 TABLET | Refills: 3 | Status: SHIPPED | OUTPATIENT
Start: 2022-11-21

## 2023-01-03 DIAGNOSIS — G43.009 MIGRAINE WITHOUT AURA AND WITHOUT STATUS MIGRAINOSUS, NOT INTRACTABLE: ICD-10-CM

## 2023-01-03 RX ORDER — VERAPAMIL HYDROCHLORIDE 40 MG/1
TABLET ORAL
Qty: 60 TABLET | Refills: 3 | Status: SHIPPED | OUTPATIENT
Start: 2023-01-03

## 2023-01-20 DIAGNOSIS — G43.709 CHRONIC MIGRAINE WITHOUT AURA WITHOUT STATUS MIGRAINOSUS, NOT INTRACTABLE: ICD-10-CM

## 2023-01-20 RX ORDER — LAMOTRIGINE 100 MG/1
TABLET ORAL
Qty: 180 TABLET | Refills: 3 | Status: SHIPPED | OUTPATIENT
Start: 2023-01-20

## 2023-01-31 ENCOUNTER — TELEPHONE (OUTPATIENT)
Dept: NEUROLOGY | Facility: CLINIC | Age: 63
End: 2023-01-31

## 2023-01-31 NOTE — TELEPHONE ENCOUNTER
Received fax from [facility] PA is about to      Key [id number]    Per enc 22-PA will  23    PA initiated on [facility]    Awaiting determination

## 2023-03-21 DIAGNOSIS — G43.709 CHRONIC MIGRAINE WITHOUT AURA WITHOUT STATUS MIGRAINOSUS, NOT INTRACTABLE: ICD-10-CM

## 2023-03-21 RX ORDER — QUETIAPINE FUMARATE 25 MG/1
TABLET, FILM COATED ORAL
Qty: 60 TABLET | Refills: 3 | Status: SHIPPED | OUTPATIENT
Start: 2023-03-21

## 2023-03-23 DIAGNOSIS — G43.009 MIGRAINE WITHOUT AURA AND WITHOUT STATUS MIGRAINOSUS, NOT INTRACTABLE: ICD-10-CM

## 2023-03-23 RX ORDER — VERAPAMIL HYDROCHLORIDE 40 MG/1
TABLET ORAL
Qty: 60 TABLET | Refills: 3 | Status: SHIPPED | OUTPATIENT
Start: 2023-03-23

## 2023-05-09 DIAGNOSIS — G43.709 CHRONIC MIGRAINE WITHOUT AURA WITHOUT STATUS MIGRAINOSUS, NOT INTRACTABLE: ICD-10-CM

## 2023-05-09 RX ORDER — TOPIRAMATE 50 MG/1
TABLET, FILM COATED ORAL
Qty: 90 TABLET | Refills: 6 | Status: SHIPPED | OUTPATIENT
Start: 2023-05-09

## 2023-05-17 NOTE — PROGRESS NOTES
Patient ID: Rosemary White is a 63 y o  female  Assessment/Plan:    Tremor  Patient with postural and action tremors in both hands which she has had for years  More recently however she developed intermittent full body tremors which she will feel about once a week  She has no clear trigger for these tremors  On exam today she had fine tremors in both hands with slightly tremulous spirals  No resting tremor or parkinsonian symptoms observed  We did discuss that she may perhaps have some underlying essential tremor versus enhanced physiological tremors  She had tried hydroxyzine in the past with no clear benefit  She is already taking topiramate for underlying migraines  In the past her Depakote was switched to Lamictal with no improvement of tremors  At this time we discussed the options including switching her metoprolol to propanolol  She does take metoprolol for hypertension which is managed by by her PCP  We will reach out to her PCP to see if they would be okay with making this switch  The goal will be that the propanolol could also help with tremor and anxiety  Will continue to monitor along with the headache team and watch for any evidence of progression with time  Subjective:    Rosemary White is a 63year old female with migraines who presents for evaluation tremors  She follows with the headache team for migraine control and it appears that it 11/2020 she began to complain of some tremors and was referred for further evaluation with the movement center  To review, she had her flu vaccination in October 2019 and felt that following that she developed an internal tremor  Describes it as a sensation of internal shaking but at times has noted that her arms and whole body visibly shake as well  She feels that she always has a "fine tremor" in the hands  At times she will notice it more than others    She states that she has always had some hand tremors for as long as she can remember  The hand tremors do not interfere with anything and are just present  She can still eat, drink and write  She feels that about once a week she will wake with "full blown tremors"  She will notice full body tremors on these days and they will last until she goes to bed that night  Nothing makes them better or worse  She feels that it is very random and has no clear trigger  She is a  and when she has a bad day it is very embarrassing for her because she will have tremors when carrying food and drinks  She was given hydroxyzine 25mg for anxiety by her PCP and per pt it did not help  She took it for few days intermittently  She was switched from Depakote to Lamictal with no improvement of tremors  She is also on topiramate for migraines  She had a brain MRI in 2017 which was normal        I personally reviewed and updated the ROS  Objective:    Blood pressure 134/80, pulse 78, height 5' 4" (1 626 m), weight 62 9 kg (138 lb 9 6 oz)  Physical Exam  Constitutional:       Appearance: Normal appearance  HENT:      Right Ear: Hearing normal       Left Ear: Hearing normal    Eyes:      General: Lids are normal       Extraocular Movements: Extraocular movements intact  Pupils: Pupils are equal, round, and reactive to light  Pulmonary:      Effort: Pulmonary effort is normal    Neurological:      Mental Status: She is alert  Deep Tendon Reflexes: Strength normal    Psychiatric:         Speech: Speech normal          Neurological Exam  Mental Status  Alert  Oriented to person, place and time  Speech is normal     Cranial Nerves  CN III, IV, VI: Extraocular movements intact bilaterally  Normal lids and orbits bilaterally  Pupils equal round and reactive to light bilaterally  CN V:  Right: Facial sensation is normal   Left: Facial sensation is normal on the left    CN VIII:  Right: Hearing is normal   Left: Hearing is normal   CN XI: Shoulder shrug strength is normal     Motor   Strength is 5/5 throughout all four extremities  Sensory  Light touch is normal in upper and lower extremities  Coordination    Mild fine postural tremors noted in both hands  Mild action tremors with finger-to-nose testing bilaterally  No resting tremor noted  No leg tremor noted  No bradykinesia with finger taps, hand , DEBBI, heel or toe taps     No rigidity  Handwriting normal, no tremor or micrographia noted  Mildly tremulous spirals bilaterally       Gait    Arose without difficulty  Normal stride length, no shuffling or freezing noted  Overall normal arm swing           ROS:    Review of Systems   Constitutional: Negative  Negative for appetite change and fever  HENT: Negative  Negative for hearing loss, tinnitus, trouble swallowing and voice change  Eyes: Negative  Negative for photophobia and pain  Respiratory: Negative  Negative for shortness of breath  Cardiovascular: Negative  Negative for palpitations  Gastrointestinal: Negative  Negative for nausea and vomiting  Endocrine: Negative  Negative for cold intolerance  Genitourinary: Negative  Negative for dysuria, frequency and urgency  Musculoskeletal: Negative  Negative for myalgias and neck pain  Skin: Negative  Negative for rash  Neurological: Positive for tremors and headaches  Negative for dizziness, seizures, syncope, facial asymmetry, speech difficulty, weakness, light-headedness and numbness  Hematological: Negative  Does not bruise/bleed easily  Psychiatric/Behavioral: Negative  Negative for confusion, hallucinations and sleep disturbance  All other systems reviewed and are negative  Detail Level: Simple Size Of Lesion: 20mm x 15mm

## 2023-05-24 ENCOUNTER — OFFICE VISIT (OUTPATIENT)
Dept: NEUROLOGY | Facility: CLINIC | Age: 63
End: 2023-05-24

## 2023-05-24 ENCOUNTER — TELEPHONE (OUTPATIENT)
Dept: NEUROLOGY | Facility: CLINIC | Age: 63
End: 2023-05-24

## 2023-05-24 VITALS
DIASTOLIC BLOOD PRESSURE: 87 MMHG | WEIGHT: 157.5 LBS | SYSTOLIC BLOOD PRESSURE: 144 MMHG | TEMPERATURE: 97.3 F | HEART RATE: 66 BPM | BODY MASS INDEX: 26.89 KG/M2 | HEIGHT: 64 IN

## 2023-05-24 DIAGNOSIS — G47.50 PARASOMNIA, UNSPECIFIED TYPE: ICD-10-CM

## 2023-05-24 DIAGNOSIS — G43.009 MIGRAINE WITHOUT AURA AND WITHOUT STATUS MIGRAINOSUS, NOT INTRACTABLE: ICD-10-CM

## 2023-05-24 DIAGNOSIS — G43.709 CHRONIC MIGRAINE WITHOUT AURA WITHOUT STATUS MIGRAINOSUS, NOT INTRACTABLE: Primary | ICD-10-CM

## 2023-05-24 DIAGNOSIS — G47.00 INSOMNIA, UNSPECIFIED TYPE: ICD-10-CM

## 2023-05-24 DIAGNOSIS — F41.1 GENERALIZED ANXIETY DISORDER: ICD-10-CM

## 2023-05-24 RX ORDER — ATOGEPANT 60 MG/1
60 TABLET ORAL DAILY
Qty: 30 TABLET | Refills: 11 | Status: SHIPPED | OUTPATIENT
Start: 2023-05-24

## 2023-05-24 RX ORDER — PROCHLORPERAZINE MALEATE 10 MG
TABLET ORAL
Qty: 10 TABLET | Refills: 6 | Status: SHIPPED | OUTPATIENT
Start: 2023-05-24

## 2023-05-24 RX ORDER — RIZATRIPTAN BENZOATE 10 MG/1
10 TABLET, ORALLY DISINTEGRATING ORAL AS NEEDED
Qty: 12 TABLET | Refills: 6 | Status: SHIPPED | OUTPATIENT
Start: 2023-05-24

## 2023-05-24 RX ORDER — CYPROHEPTADINE HYDROCHLORIDE 4 MG/1
4 TABLET ORAL AS NEEDED
Qty: 90 TABLET | Refills: 3 | Status: SHIPPED | OUTPATIENT
Start: 2023-05-24

## 2023-05-24 RX ORDER — PRAZOSIN HYDROCHLORIDE 1 MG/1
CAPSULE ORAL
Qty: 300 CAPSULE | Refills: 2 | Status: SHIPPED | OUTPATIENT
Start: 2023-05-24

## 2023-05-24 RX ORDER — VERAPAMIL HYDROCHLORIDE 80 MG/1
80 TABLET ORAL 3 TIMES DAILY
Qty: 270 TABLET | Refills: 3 | Status: SHIPPED | OUTPATIENT
Start: 2023-05-24

## 2023-05-24 RX ORDER — RIMEGEPANT SULFATE 75 MG/75MG
75 TABLET, ORALLY DISINTEGRATING ORAL AS NEEDED
Qty: 16 TABLET | Refills: 11 | Status: SHIPPED | OUTPATIENT
Start: 2023-05-24

## 2023-05-24 RX ORDER — DEXAMETHASONE 1 MG
TABLET ORAL
Qty: 10 TABLET | Refills: 5 | Status: SHIPPED | OUTPATIENT
Start: 2023-05-24

## 2023-05-24 RX ORDER — HYDROXYZINE HYDROCHLORIDE 25 MG/1
TABLET, FILM COATED ORAL
Qty: 30 TABLET | Refills: 1 | Status: SHIPPED | OUTPATIENT
Start: 2023-05-24

## 2023-05-24 NOTE — PATIENT INSTRUCTIONS
Chronic migraine headache without aura:    Preventive therapy:  - continue magnesium 500 mg and vitamin B2 100 mg 1 tab daily  - cyproheptadine 4 mg at bedtime for weather related headahces  - Topamax 50 mg in the morning and 100 mg at bedtime  -Lamictal 100 mg twice a day  -STOP Seroquel 25 mg at bedtime  - Increase Verapamil to 80 mg three times a day  Continue meds for depression   - Qulipta 60 mg daily  - Start Prazosin 1 mg at bedtime  Increase every 2-3 nights until either sleeping better/less nightmares/dreams or up to 10 mg    Abortive therapy for migraine headaches:   - at the onset of a migraine headache she is to take Nurtec 75 mg  Limit of 1 in 24 hours  IF not alleviated may use Compazine 10 mg and rizatriptan 10 mg  May repeat the rizatriptan in 2 hours  Limit of 3 a week  (may use rizatriptan if Nurtec doesn't work)  If in 2 hours her headache has not improved she has take Decadron 1 mg  She may also take cyproheptadine 4 mg at bedtime that night to help with headaches  Home sleep study ordered  I am placing a referral to the sleep medicine specialist team to further evaluate your sleep issues    Please call [phone] to schedule and I recommend you see one of the following providers:    Emy Tellez PA-C

## 2023-05-24 NOTE — ASSESSMENT & PLAN NOTE
Preventive therapy:  - continue magnesium 500 mg and vitamin B2 100 mg 1 tab daily  - cyproheptadine 4 mg at bedtime for weather related headahces  - Topamax 50 mg in the morning and 100 mg at bedtime  -Lamictal 100 mg twice a day  -STOP Seroquel 25 mg at bedtime  - Increase Verapamil to 80 mg three times a day  Continue meds for depression   - Qulipta 60 mg daily  - Start Prazosin 1 mg at bedtime  Increase every 2-3 nights until either sleeping better/less nightmares/dreams or up to 10 mg    Abortive therapy for migraine headaches:   - at the onset of a migraine headache she is to take Nurtec 75 mg  Limit of 1 in 24 hours  IF not alleviated may use Compazine 10 mg and rizatriptan 10 mg  May repeat the rizatriptan in 2 hours  Limit of 3 a week  (may use rizatriptan if Nurtec doesn't work)  If in 2 hours her headache has not improved she has take Decadron 1 mg  She may also take cyproheptadine 4 mg at bedtime that night to help with headaches

## 2023-05-24 NOTE — PROGRESS NOTES
[facility]  PATIENT:  Sunny Tarango  MRN:  [id number]  :  1960  DATE OF SERVICE:  2023      Assessment/Plan:     Headache, chronic migraine without aura  Preventive therapy:  - continue magnesium 500 mg and vitamin B2 100 mg 1 tab daily  - cyproheptadine 4 mg at bedtime for weather related headahces  - Topamax 50 mg in the morning and 100 mg at bedtime  -Lamictal 100 mg twice a day  -STOP Seroquel 25 mg at bedtime  - Increase Verapamil to 80 mg three times a day  Continue meds for depression   - Qulipta 60 mg daily  - Start Prazosin 1 mg at bedtime  Increase every 2-3 nights until either sleeping better/less nightmares/dreams or up to 10 mg    Abortive therapy for migraine headaches:   - at the onset of a migraine headache she is to take Nurtec 75 mg  Limit of 1 in 24 hours  IF not alleviated may use Compazine 10 mg and rizatriptan 10 mg  May repeat the rizatriptan in 2 hours  Limit of 3 a week  (may use rizatriptan if Nurtec doesn't work)  If in 2 hours her headache has not improved she has take Decadron 1 mg  She may also take cyproheptadine 4 mg at bedtime that night to help with headaches  Parasomnia  Home sleep study ordered  Referral to sleep medicine         Problem List Items Addressed This Visit        Cardiovascular and Mediastinum    Headache, chronic migraine without aura - Primary     Preventive therapy:  - continue magnesium 500 mg and vitamin B2 100 mg 1 tab daily  - cyproheptadine 4 mg at bedtime for weather related headahces  - Topamax 50 mg in the morning and 100 mg at bedtime  -Lamictal 100 mg twice a day  -STOP Seroquel 25 mg at bedtime  - Increase Verapamil to 80 mg three times a day  Continue meds for depression   - Qulipta 60 mg daily  - Start Prazosin 1 mg at bedtime    Increase every 2-3 nights until either sleeping better/less nightmares/dreams or up to 10 mg    Abortive therapy for migraine headaches:   - at the onset of a migraine headache she is to take Nurtec 75 mg  Limit of 1 in 24 hours  IF not alleviated may use Compazine 10 mg and rizatriptan 10 mg  May repeat the rizatriptan in 2 hours  Limit of 3 a week  (may use rizatriptan if Nurtec doesn't work)  If in 2 hours her headache has not improved she has take Decadron 1 mg  She may also take cyproheptadine 4 mg at bedtime that night to help with headaches  Relevant Medications    rimegepant sulfate (Nurtec) 75 mg TBDP    cyproheptadine (PERIACTIN) 4 mg tablet    prochlorperazine (COMPAZINE) 10 mg tablet    verapamil (CALAN) 80 mg tablet    Atogepant (Qulipta) 60 MG TABS    dexamethasone (DECADRON) 1 mg tablet    rizatriptan (MAXALT-MLT) 10 mg disintegrating tablet    Migraine without aura and without status migrainosus, not intractable    Relevant Medications    rimegepant sulfate (Nurtec) 75 mg TBDP    verapamil (CALAN) 80 mg tablet    Atogepant (Qulipta) 60 MG TABS    rizatriptan (MAXALT-MLT) 10 mg disintegrating tablet       Other    Insomnia    Relevant Medications    prazosin (MINIPRESS) 1 mg capsule    Other Relevant Orders    Ambulatory Referral to Sleep Medicine    Home Study    Generalized anxiety disorder    Relevant Medications    prochlorperazine (COMPAZINE) 10 mg tablet    hydrOXYzine HCL (ATARAX) 25 mg tablet    Parasomnia     Home sleep study ordered  Referral to sleep medicine         Relevant Medications    prazosin (MINIPRESS) 1 mg capsule    Other Relevant Orders    Ambulatory Referral to Sleep Medicine    Home Study           History of Present Illness: We had the pleasure of evaluating Summer Avelar in neurological follow up  today for headaches  As you know,  she is a 66 y o   right handed female  She is a  and is here today for evaluation of her headaches  2 months ago, woke up with migraine 10/10, vomiting, lasted a few days  Now waking up with headaches almost every day  Wakes up with an 8-9/10  Works nights now    Goes to bed at 11-12 am and wakes up at 8-830 am   Wakes up 1-2 times a night  Talks in her sleep most of the night  Her mom passed away in August 2022  Today is her mom's birthday        Past medical history:  QTC: 411/25/2019 - 408   -GERD  - hypertension  - depression /anxiety     Depression/generalized anxiety/migraine update:   Since last seen patient states that due to COVID her anxiety is much worse  Zuly No has also exacerbated her headaches  Her Mom fell and broke her femur and then was hospitalized in December [address] mother moved to her sister's house in another state   Wasn't talking to her because she thought she was going to have her killed  Kadeem Even started to go to counseling over this  Hannah Courser has put her dad's house up for sale and is having problems with her dad's girlfriend  José Miguel Salazar get her injections anymore   Last Emgality injection was in June 2021        Couldn't continue the Emgality due to injection pain and discomfort     Internal tremor:   Pt states that after her flu vaccination in October 2019, she developed this internal tremor   Describes it as a sensation of internal shaking but it time has noted that her arms and whole body shakes as well       Onset: after the flu vaccination  Occurs: It tends to occurs 3-4 times a week  Lasts: all day or at time will go into the next day  Trigger: none  She was given hydroxyzine 25mg  by her PCP and per pt it did not help   She took it for few days intermittently      Chronic Migraine headaches :  What medications do you take or have you taken for your headaches/mood/BP?      Current Preventive:   vitamin D3, vitamin B12, magnesium, B2  cyproheptadine  Lamictal, Topamax  Seroquel, Clonazepam, Atarax  Propranolol, verapamil  Qulipta     Current Abortive:   rizatriptan  Nurtec  Prochlorperazine  Ketorolac   Dexamethasone     Prior Preventative:  - Daith earring: in place and did help in the beginning and then did not thus pt took it out  - Botox (x 2 with no help),  - Toprol 100 mg, Propranolol  - Zoloft 100 mg, amitriptyline 10 mg, Venlafaxine,   - olanzapine 5 mg, risperidone 1 mg,  - gabapentin, Depakote   - Benadryl 50 mg, hydroxyzine 25 mg,  - Robaxin 1 g,  Vyepti  Emgality  Prior Abortive:   - Excedrin migraine,  - Imitrex  - DHE 1 mg (IV infusion -2018)  - Toradol 30/60 mg injection   - Prednisone (no headaches with steroids) she took it for a week,         What is your current pain? 5/10     How often do the headaches occur ?   Mild headache: 2-3 a week  Mod to severe: wakes up almost daily with these now, 8-12 or more     Are you ever headache free? maybe 1 a week if that      Aura/warning prior to headache onset? none     What time of the day do the headaches start? Varies but primarily begin in the am     How long do the headaches last: all day?   Mild headaches:  all day  Moderate to severe headache:  Few hours with Maxalt as it works 80% of the time  Severo Candy this fails may last half of the day as she has to take the Decadron to aborted  [facility] if 10/10 with vomiting will last rest of the day     Where are they located? frontal, temporal and jaw pain     What is the intensity of pain? 8-10/10     Describe your usual headache? Throbbing, pressure, achy, shooting, stabbing     Associated symptoms:   -  nausea, vomiting  -  photophobia, phonophobia, sensitivity to smell  -  concentration problems  -  light-headed or dizzy  -  prefer to be alone and in a dark room      Number of days missed per month because of headaches:  Work (or school) days:  Missed 2 days last week  Social or Family activities: none     Headache trigger: Fatigue, Stress/Tension, chocolate and turkey  Have you used CBD or THC for your headaches and how often? No  Are you current pregnant or planning on getting pregnant? No     Have you ever had any Brain imaging? yes        12/27/17 MRI C spine    IMPRESSION:Mild spondylotic changes are similar to the previous study      12/27/17 MRI Brain  IMPRESSION:No acute infarction, intracranial hemorrhage or mass  I personally reviewed these images       Reviewed old notes from physician seen in the past- see above HPI for summary of previous encounters           Past Medical History:   Diagnosis Date   • Anxiety    • Depression    • Disease of thyroid gland    • History of transfusion    • Hypertension    • Migraine        Patient Active Problem List   Diagnosis   • Cellulitis   • Insomnia   • Headache, chronic migraine without aura   • Generalized anxiety disorder   • Essential hypertension   • GERD (gastroesophageal reflux disease)   • Tremor   • Parasomnia   • Migraine without aura and without status migrainosus, not intractable       Medications:      Current Outpatient Medications   Medication Sig Dispense Refill   • Atogepant (Qulipta) 60 MG TABS Take 60 mg by mouth in the morning 30 tablet 11   • Cholecalciferol (VITAMIN D3 PO) Take 1 tablet by mouth daily     • clonazePAM (KlonoPIN) 0 5 mg tablet Take 0 5 mg by mouth daily    0   • cyanocobalamin (VITAMIN B-12) 1,000 mcg tablet Take 1,000 mcg by mouth daily       • cyproheptadine (PERIACTIN) 4 mg tablet Take 1 tablet (4 mg total) by mouth as needed (weather headaches) 90 tablet 3   • dexamethasone (DECADRON) 1 mg tablet TAKE 1 TABLET BY MOUTH AT ONSET OF SEVERE HEADACHE WITH FOOD 10 tablet 5   • hydrOXYzine HCL (ATARAX) 25 mg tablet 1 tab at bedtime nightly 30 tablet 1   • lamoTRIgine (LaMICtal) 100 mg tablet take 1 tablet by mouth twice a day 180 tablet 3   • Magnesium Oxide 250 MG TABS Take 1 tablet by mouth daily       • omeprazole (PriLOSEC) 40 MG capsule Take 40 mg by mouth daily  0   • prazosin (MINIPRESS) 1 mg capsule Start with 1 tab at bedtime for 2-3 nights   May increase every 2-3 night until either sleeping better/less nightmares/dreams or up to 10 mg 300 capsule 2   • prochlorperazine (COMPAZINE) 10 mg tablet TAKE 1 TABLET AT ONSET OF MIGRAINE, CAN REPEAT IN 8 HOURS, CAN TAKE WITH TRIPTAN/NSAID 10 tablet 6   • propranolol (INNOPRAN XL) 120 MG 24 hr capsule Take 120 mg by mouth daily at bedtime     • Red Yeast Rice Extract (RED YEAST RICE PO) Take 1 tablet by mouth daily     • Riboflavin (VITAMIN B-2) 100 MG TABS Take 1 tablet by mouth daily       • rimegepant sulfate (Nurtec) 75 mg TBDP Take 1 tablet (75 mg total) by mouth as needed (migraine) Limit of 1 in 24 hours 16 tablet 11   • rizatriptan (MAXALT-MLT) 10 mg disintegrating tablet Take 1 tablet (10 mg total) by mouth as needed for migraine May repeat in 2 hours if needed  Limit of 3 a week or 12 a month 12 tablet 6   • topiramate (TOPAMAX) 50 MG tablet take 1 tablet by mouth every morning and 2 tablets at bedtime 90 tablet 6   • triamcinolone (KENALOG) 0 1 % ointment Apply 1 application topically as needed   0   • verapamil (CALAN) 80 mg tablet Take 1 tablet (80 mg total) by mouth 3 (three) times a day 270 tablet 3     No current facility-administered medications for this visit  Allergies:       Allergies   Allergen Reactions   • Ceftazidime      [facility]   • Cefuroxime      [facility]  [facility]   • Oxymetazoline      [facility]   • Tetracycline Rash       Family History:     Family History   Problem Relation Age of Onset   • Hypertension Mother    • Bipolar disorder Family        Social History:     Social History     Socioeconomic History   • Marital status: Single     Spouse name: Not on file   • Number of children: Not on file   • Years of education: Not on file   • Highest education level: Not on file   Occupational History   • Not on file   Tobacco Use   • Smoking status: Every Day     Packs/day: 0 25     Years: 10 00     Total pack years: 2 50     Types: Cigarettes   • Smokeless tobacco: Never   • Tobacco comments:     Pt  may be interested in future   Vaping Use   • Vaping Use: Never used   Substance and Sexual Activity   • Alcohol use: No   • Drug use: No   • Sexual activity: Yes "Partners: Male     Birth control/protection: Post-menopausal   Other Topics Concern   • Not on file   Social History Narrative   • Not on file     Social Determinants of Health     Financial Resource Strain: Not on file   Food Insecurity: Not on file   Transportation Needs: Not on file   Physical Activity: Not on file   Stress: Not on file   Social Connections: Not on file   Intimate Partner Violence: Not on file   Housing Stability: Not on file      I have reviewed the patient's medical, social and surgical history as well as medications in detail and updated the computerized patient record  Objective:   Physical Exam:                                                                   Vitals:            /87 (BP Location: Left arm, Patient Position: Sitting, Cuff Size: Standard)   Pulse 66   Temp (!) 97 3 °F (36 3 °C) (Temporal)   Ht 5' 4\" (1 626 m)   Wt 71 4 kg (157 lb 8 oz)   BMI 27 03 kg/m²   BP Readings from Last 3 Encounters:   05/24/23 144/87   07/20/22 165/96   02/16/22 140/80     Pulse Readings from Last 3 Encounters:   05/24/23 66   07/20/22 76   02/16/22 81          CONSTITUTIONAL: Well developed, well nourished, well groomed  No dysmorphic features  Eyes:  EOM normal      Neck:  Normal ROM, neck supple  HEENT:  Normocephalic atraumatic  Chest:  Respirations regular and unlabored  Psychiatric:  Normal behavior and appropriate affect      MENTAL STATUS  Orientation: Alert and oriented x 3  Fund of knowledge: Intact  MOTOR (Upper and lower extremities)   Bulk/tone/abnormal movement: Normal muscle bulk and tone  COORDINATION   Station/Gait: Normal baseline gait  Review of Systems:   Review of Systems  Constitutional: Negative  HENT: Negative  Eyes: Negative  Respiratory: Negative  Cardiovascular: Negative  Gastrointestinal: Negative  Endocrine: Negative  Genitourinary: Negative  Musculoskeletal: Negative  Skin: Negative    " Allergic/Immunologic: Negative  Neurological: Positive for headaches  Hematological: Negative  Psychiatric/Behavioral: Negative  I personally reviewed the ROS entered by the MA      I have spent a total time of 48 minutes on 05/24/23 in caring for this patient including Diagnostic results, Risks and benefits of tx options, Instructions for management, Patient and family education, Importance of tx compliance, Risk factor reductions, Impressions, Counseling / Coordination of care, Documenting in the medical record, Reviewing / ordering tests, medicine, procedures   and Obtaining or reviewing history          Author:  Hali Latif PA-C 5/24/2023 10:22 AM

## 2023-05-24 NOTE — TELEPHONE ENCOUNTER
Recd vm  I am calling from [facility], pharmacy in  [facility]  I am calling on a prescription that's requiring prior authorization for a patient vineet  Last name is K, L O T Z; date of birth is [birth year]  It is for qulipta 60 mg  Her insurance is requiring a PA   her ID with paid direct is [id number]  And the phone number to call for that authorization is gonna be [phone]  I will also send it to you guys through cover my meds, so you may see it in there  If you use that, any questions or updates this [phone]  Thank you

## 2023-05-26 NOTE — TELEPHONE ENCOUNTER
A hi calling from [facility] in [place] to follow up on a prior authorization for Hussain Nava, date of birth [birth year] for Qulipta 60 milligram tablets prescribed bu Dr Caprice Fatima looks like  We left a message with some of the insurance info and letting you guys know needed a prior auth a couple of days ago  So I was just calling to see if there was any updates  If you have anything for us or have any questions, phone numbers [phone]  Thanks bye  [facility], spoke to STEFANIE and made aware that PA is currently pending  Can take up to 72 hrs to get the final determination  He verbalized understanding      Awaiting determination

## 2023-05-30 NOTE — TELEPHONE ENCOUNTER
PA has been approved through 5/28/24    Will call [facility]

## 2023-06-02 NOTE — TELEPHONE ENCOUNTER
MSW phoned patient calling to follow up and requesting that she calls back Raza Harmon in regards to conversation about Emgality   Please call back st evangelist damian no

## 2023-08-19 DIAGNOSIS — G47.00 INSOMNIA, UNSPECIFIED TYPE: ICD-10-CM

## 2023-08-19 DIAGNOSIS — G47.50 PARASOMNIA, UNSPECIFIED TYPE: ICD-10-CM

## 2023-08-21 RX ORDER — PRAZOSIN HYDROCHLORIDE 1 MG/1
CAPSULE ORAL
Qty: 300 CAPSULE | Refills: 2 | Status: SHIPPED | OUTPATIENT
Start: 2023-08-21

## 2023-09-27 ENCOUNTER — OFFICE VISIT (OUTPATIENT)
Dept: NEUROLOGY | Facility: CLINIC | Age: 63
End: 2023-09-27

## 2023-09-27 VITALS
DIASTOLIC BLOOD PRESSURE: 86 MMHG | WEIGHT: 161.2 LBS | HEIGHT: 64 IN | BODY MASS INDEX: 27.52 KG/M2 | SYSTOLIC BLOOD PRESSURE: 131 MMHG | TEMPERATURE: 97.8 F | HEART RATE: 64 BPM

## 2023-09-27 DIAGNOSIS — G43.709 CHRONIC MIGRAINE WITHOUT AURA WITHOUT STATUS MIGRAINOSUS, NOT INTRACTABLE: Primary | ICD-10-CM

## 2023-09-27 DIAGNOSIS — G47.50 PARASOMNIA, UNSPECIFIED TYPE: ICD-10-CM

## 2023-09-27 DIAGNOSIS — G93.2 INTRACRANIAL HYPERTENSION: ICD-10-CM

## 2023-09-27 DIAGNOSIS — G47.00 INSOMNIA, UNSPECIFIED TYPE: ICD-10-CM

## 2023-09-27 DIAGNOSIS — G43.009 MIGRAINE WITHOUT AURA AND WITHOUT STATUS MIGRAINOSUS, NOT INTRACTABLE: ICD-10-CM

## 2023-09-27 RX ORDER — ACETAZOLAMIDE 125 MG/1
TABLET ORAL
Qty: 120 TABLET | Refills: 2 | Status: SHIPPED | OUTPATIENT
Start: 2023-09-27

## 2023-09-27 NOTE — ASSESSMENT & PLAN NOTE
Preventive therapy:  - continue magnesium 500 mg and vitamin B2 100 mg 1 tab daily  - cyproheptadine 4 mg at bedtime for weather related headahces  - Topamax 50 mg in the morning and 100 mg at bedtime  -Lamictal 100 mg twice a day  - Verapamil to 80 mg three times a day  Continue meds for depression   - Qulipta 60 mg daily  - Prazosin 6 mg at bedtime.   - Start acetazolamide 125 mg 1 tab twice a day (second dose late afternoon/evening) for 7 days then increase to 2 tabs twice a day. Message or call after on 2 tabs twice a day so we can see if we need to adjust or if you are ready for the topamax taper off    Abortive therapy for migraine headaches:   - at onset of migraine, take rizatriptan 10 mg. May repeat in 2 hours if needed. Limit of 3 a week    IF not alleviated may use Compazine 10 mg     If in 2 hours her headache has not improved she has take Decadron 1 mg. She may also take cyproheptadine 4 mg at bedtime that night to help with headaches.

## 2023-09-27 NOTE — ASSESSMENT & PLAN NOTE
Continue prazosin 6 mg at bedtime as this has been extremely helpful    do recommend referral to sleep medicine as well as a home sleep study but patient defers at this time

## 2023-09-27 NOTE — PATIENT INSTRUCTIONS
Chronic migraine headache without aura:    Preventive therapy:  - continue magnesium 500 mg and vitamin B2 100 mg 1 tab daily  - cyproheptadine 4 mg at bedtime for weather related headahces  - Topamax 50 mg in the morning and 100 mg at bedtime  -Lamictal 100 mg twice a day  - Verapamil to 80 mg three times a day  Continue meds for depression   - Qulipta 60 mg daily  - Prazosin 6 mg at bedtime.   - Start acetazolamide 125 mg 1 tab twice a day (second dose late afternoon/evening) for 7 days then increase to 2 tabs twice a day. Message or call after on 2 tabs twice a day so we can see if we need to adjust or if you are ready for the topamax taper off    Abortive therapy for migraine headaches:   - at onset of migraine, take rizatriptan 10 mg. May repeat in 2 hours if needed. Limit of 3 a week    IF not alleviated may use Compazine 10 mg     If in 2 hours her headache has not improved she has take Decadron 1 mg. She may also take cyproheptadine 4 mg at bedtime that night to help with headaches. Please get blood work in 4-6 weeks    Home sleep study ordered  I am placing a referral to the sleep medicine specialist team to further evaluate your sleep issues.   Please call [phone] to schedule and I recommend you see one of the following providers:    Sin Brink PA-C

## 2023-09-27 NOTE — ASSESSMENT & PLAN NOTE
Start acetazolamide 125 mg twice a day for 7 days then increase to 2 tablets twice a day. We can titrate up as needed and then taper off the Topamax once at a good dose.

## 2023-09-27 NOTE — PROGRESS NOTES
[facility]  PATIENT:  Brittani Woods  MRN:  [id number]  :  1960  DATE OF SERVICE:  2023      Assessment/Plan:     Headache, chronic migraine without aura  Preventive therapy:  - continue magnesium 500 mg and vitamin B2 100 mg 1 tab daily  - cyproheptadine 4 mg at bedtime for weather related headahces  - Topamax 50 mg in the morning and 100 mg at bedtime  -Lamictal 100 mg twice a day  - Verapamil to 80 mg three times a day  Continue meds for depression   - Qulipta 60 mg daily  - Prazosin 6 mg at bedtime.   - Start acetazolamide 125 mg 1 tab twice a day (second dose late afternoon/evening) for 7 days then increase to 2 tabs twice a day. Message or call after on 2 tabs twice a day so we can see if we need to adjust or if you are ready for the topamax taper off    Abortive therapy for migraine headaches:   - at onset of migraine, take rizatriptan 10 mg. May repeat in 2 hours if needed. Limit of 3 a week    IF not alleviated may use Compazine 10 mg     If in 2 hours her headache has not improved she has take Decadron 1 mg. She may also take cyproheptadine 4 mg at bedtime that night to help with headaches. Parasomnia  Continue prazosin 6 mg at bedtime as this has been extremely helpful    do recommend referral to sleep medicine as well as a home sleep study but patient defers at this time    Intracranial hypertension  Start acetazolamide 125 mg twice a day for 7 days then increase to 2 tablets twice a day. We can titrate up as needed and then taper off the Topamax once at a good dose.          Problem List Items Addressed This Visit        Cardiovascular and Mediastinum    Headache, chronic migraine without aura - Primary     Preventive therapy:  - continue magnesium 500 mg and vitamin B2 100 mg 1 tab daily  - cyproheptadine 4 mg at bedtime for weather related headahces  - Topamax 50 mg in the morning and 100 mg at bedtime  -Lamictal 100 mg twice a day  - Verapamil to 80 mg three times a day  Continue meds for depression   - Qulipta 60 mg daily  - Prazosin 6 mg at bedtime.   - Start acetazolamide 125 mg 1 tab twice a day (second dose late afternoon/evening) for 7 days then increase to 2 tabs twice a day. Message or call after on 2 tabs twice a day so we can see if we need to adjust or if you are ready for the topamax taper off    Abortive therapy for migraine headaches:   - at onset of migraine, take rizatriptan 10 mg. May repeat in 2 hours if needed. Limit of 3 a week    IF not alleviated may use Compazine 10 mg     If in 2 hours her headache has not improved she has take Decadron 1 mg. She may also take cyproheptadine 4 mg at bedtime that night to help with headaches. Migraine without aura and without status migrainosus, not intractable       Other    Insomnia    Parasomnia     Continue prazosin 6 mg at bedtime as this has been extremely helpful    do recommend referral to sleep medicine as well as a home sleep study but patient defers at this time         Intracranial hypertension     Start acetazolamide 125 mg twice a day for 7 days then increase to 2 tablets twice a day. We can titrate up as needed and then taper off the Topamax once at a good dose. Relevant Medications    acetaZOLAMIDE (DIAMOX) 125 mg tablet    Other Relevant Orders    CBC and differential    Comprehensive metabolic panel           History of Present Illness: We had the pleasure of evaluating Cristhian Guevara in neurological follow up  today for headaches. As you know,  she is a 66 y.o.  right handed female. She is a  and is here today for evaluation of her headaches.      2 months ago, woke up with migraine 10/10, vomiting, lasted a few days. Now waking up with headaches almost every day. Wakes up with an 8-9/10. Works nights now. Goes to bed at 11-12 am and wakes up at 8-830 am.  Wakes up 1-2 times a night. Talks in her sleep most of the night.   Her mom passed away in August 2022.  Today is her mom's birthday        Past medical history:  QTC: 411/25/2019 - 408   -GERD  - hypertension  - depression /anxiety     Depression/generalized anxiety/migraine update:   Since last seen patient states that due to COVID her anxiety is much worse. Margarette Gomez has also exacerbated her headaches. Her Mom fell and broke her femur and then was hospitalized in December. [address] [facility] mother moved to her sister's house in another state.  Wasn't talking to her because she thought she was going to have her killed. Lili Gillis started to go to counseling over this. Helen Kitsamanta has put her dad's house up for sale and is having problems with her dad's girlfriend. Owen Martinez get her injections anymore.  Last Emgality injection was in June 2021.       Couldn't continue the Emgality due to injection pain and discomfort     Internal tremor:   Pt states that after her flu vaccination in October 2019, she developed this internal tremor.  Describes it as a sensation of internal shaking but it time has noted that her arms and whole body shakes as well.      Onset: after the flu vaccination  Occurs: It tends to occurs 3-4 times a week. Lasts: all day or at time will go into the next day  Trigger: none  She was given hydroxyzine 25mg  by her PCP and per pt it did not help.  She took it for few days intermittently.     Chronic Migraine headaches :  What medications do you take or have you taken for your headaches/mood/BP?      Current Preventive:   vitamin D3, vitamin B12, magnesium, B2  cyproheptadine  Lamictal, Topamax  Seroquel, Clonazepam, Atarax  Propranolol, verapamil  Qulipta     Current Abortive:   rizatriptan  Nurtec  Prochlorperazine  Ketorolac   Dexamethasone     Prior Preventative:  - Daith earring: in place and did help in the beginning and then did not thus pt took it out  - Botox (x 2 with no help),  - Toprol 100 mg, Propranolol  - Zoloft 100 mg, amitriptyline 10 mg, Venlafaxine,   - olanzapine 5 mg, risperidone 1 mg,  - gabapentin, Depakote   - Benadryl 50 mg, hydroxyzine 25 mg,  - Robaxin 1 g,  Vyepti  Emgality  Prior Abortive:   - Excedrin migraine,  - Imitrex  - DHE 1 mg (IV infusion -2018)  - Toradol 30/60 mg injection   - Prednisone (no headaches with steroids) she took it for a week,         What is your current pain? 3/10     How often do the headaches occur ?   Mild headache: 3-4 a week  Severe:  has only been 3 times in 3+ monthswakes up almost daily with these now, 8-12 or more     Are you ever headache free? maybe 1 a week if that      Aura/warning prior to headache onset? none     What time of the day do the headaches start? Varies but primarily begin in the am     How long do the headaches last: all day?   Mild headaches:  all day  Moderate to severe headache:  Few hours with Maxalt as it works 80% of the time. [employer] this fails may last half of the day as she has to take the Decadron to aborted. [place] if 10/10 with vomiting will last rest of the day     Where are they located? frontal, temporal and jaw pain     What is the intensity of pain?   Mild: 3-5/10  Severe: 8-10/10     Describe your usual headache? Throbbing, pressure, achy, shooting, stabbing     Associated symptoms:   -  nausea, vomiting  -  photophobia, phonophobia, sensitivity to smell  -  concentration problems  -  light-headed or dizzy  -  prefer to be alone and in a dark room      Number of days missed per month because of headaches:  Work (or school) days: [employer] 2 days last week  Social or Family activities: none     Headache trigger: Fatigue, Stress/Tension, chocolate and turkey  Have you used CBD or THC for your headaches and how often? No  Are you current pregnant or planning on getting pregnant? No     Have you ever had any Brain imaging? yes        12/27/17 MRI C spine    IMPRESSION:Mild spondylotic changes are similar to the previous study.     12/27/17 MRI Brain  IMPRESSION:No acute infarction, intracranial hemorrhage or mass.   I personally reviewed these images.   Reviewed today with patient on 9/27/2023 there does appear to be some cerebellar medullary crowding some tonsillar pointing as well as some optic nerve pale and tortuosity.   There is slight empty sella as well     Reviewed old notes from physician seen in the past- see above HPI for summary of previous encounters.             Past Medical History:   Diagnosis Date   • Anxiety    • Depression    • Disease of thyroid gland    • History of transfusion    • Hypertension    • Migraine        Patient Active Problem List   Diagnosis   • Cellulitis   • Insomnia   • Headache, chronic migraine without aura   • Generalized anxiety disorder   • Essential hypertension   • GERD (gastroesophageal reflux disease)   • Tremor   • Parasomnia   • Migraine without aura and without status migrainosus, not intractable   • Intracranial hypertension       Medications:      Current Outpatient Medications   Medication Sig Dispense Refill   • acetaZOLAMIDE (DIAMOX) 125 mg tablet 1 tab twice a day (second dose late afternoon/evening) for 7 days then increase to 2 tabs twice a day 120 tablet 2   • Atogepant (Qulipta) 60 MG TABS Take 60 mg by mouth in the morning 30 tablet 11   • Cholecalciferol (VITAMIN D3 PO) Take 1 tablet by mouth daily     • clonazePAM (KlonoPIN) 0.5 mg tablet Take 0.5 mg by mouth daily    0   • cyanocobalamin (VITAMIN B-12) 1,000 mcg tablet Take 1,000 mcg by mouth daily       • cyproheptadine (PERIACTIN) 4 mg tablet Take 1 tablet (4 mg total) by mouth as needed (weather headaches) 90 tablet 3   • dexamethasone (DECADRON) 1 mg tablet TAKE 1 TABLET BY MOUTH AT ONSET OF SEVERE HEADACHE WITH FOOD 10 tablet 5   • hydrOXYzine HCL (ATARAX) 25 mg tablet 1 tab at bedtime nightly 30 tablet 1   • lamoTRIgine (LaMICtal) 100 mg tablet take 1 tablet by mouth twice a day 180 tablet 3   • Magnesium Oxide 250 MG TABS Take 1 tablet by mouth daily       • omeprazole (PriLOSEC) 40 MG capsule Take 40 mg by mouth daily  0   • prazosin (MINIPRESS) 1 mg capsule START WITH 1 TABLET AT BEDTIME FOR 2 TO 3 NIGHTS MAY INCREASE EVERY 2 TO 3 NIGHTS UNTIL EITHER SLEEPING BETTER OR LESS NIGHTMARES/DREAMS OR UP TO 10MG(10 CAPSULES) (Patient taking differently: Take 6 mg by mouth daily at bedtime START WITH 1 TABLET AT BEDTIME FOR 2 TO 3 NIGHTS MAY INCREASE EVERY 2 TO 3 NIGHTS UNTIL EITHER SLEEPING BETTER OR LESS NIGHTMARES/DREAMS OR UP TO 10MG(10 CAPSULES)) 300 capsule 2   • prochlorperazine (COMPAZINE) 10 mg tablet TAKE 1 TABLET AT ONSET OF MIGRAINE, CAN REPEAT IN 8 HOURS, CAN TAKE WITH TRIPTAN/NSAID 10 tablet 6   • propranolol (INNOPRAN XL) 120 MG 24 hr capsule Take 120 mg by mouth daily at bedtime     • Red Yeast Rice Extract (RED YEAST RICE PO) Take 1 tablet by mouth daily     • Riboflavin (VITAMIN B-2) 100 MG TABS Take 1 tablet by mouth daily       • rizatriptan (MAXALT-MLT) 10 mg disintegrating tablet Take 1 tablet (10 mg total) by mouth as needed for migraine May repeat in 2 hours if needed. Limit of 3 a week or 12 a month 12 tablet 6   • topiramate (TOPAMAX) 50 MG tablet take 1 tablet by mouth every morning and 2 tablets at bedtime 90 tablet 6   • triamcinolone (KENALOG) 0.1 % ointment Apply 1 application topically as needed   0   • verapamil (CALAN) 80 mg tablet Take 1 tablet (80 mg total) by mouth 3 (three) times a day 270 tablet 3     No current facility-administered medications for this visit. Allergies:       Allergies   Allergen Reactions   • Ceftazidime      [facility]   • Cefuroxime      [facility]  [facility]   • Oxymetazoline      [facility]   • Tetracycline Rash       Family History:     Family History   Problem Relation Age of Onset   • Hypertension Mother    • Bipolar disorder Family        Social History:     Social History     Socioeconomic History   • Marital status: Single     Spouse name: Not on file   • Number of children: Not on file   • Years of education: Not on file   • Highest education level: Not on file   Occupational History   • Not on file   Tobacco Use   • Smoking status: Every Day     Packs/day: 0.25     Years: 10.00     Total pack years: 2.50     Types: Cigarettes   • Smokeless tobacco: Never   • Tobacco comments:     Pt. may be interested in future   Vaping Use   • Vaping Use: Never used   Substance and Sexual Activity   • Alcohol use: No   • Drug use: No   • Sexual activity: Yes     Partners: Male     Birth control/protection: Post-menopausal   Other Topics Concern   • Not on file   Social History Narrative   • Not on file     Social Determinants of Health     Financial Resource Strain: Not on file   Food Insecurity: Not on file   Transportation Needs: Not on file   Physical Activity: Not on file   Stress: Not on file   Social Connections: Not on file   Intimate Partner Violence: Not on file   Housing Stability: Not on file         Objective:   Physical Exam:                                                                   Vitals:            /86 (BP Location: Left arm, Patient Position: Sitting, Cuff Size: Standard)   Pulse 64   Temp 97.8 °F (36.6 °C) (Temporal)   Ht 5' 4" (1.626 m)   Wt 73.1 kg (161 lb 3.2 oz)   BMI 27.67 kg/m²   BP Readings from Last 3 Encounters:   09/27/23 131/86   05/24/23 144/87   07/20/22 165/96     Pulse Readings from Last 3 Encounters:   09/27/23 64   05/24/23 66   07/20/22 76          CONSTITUTIONAL: Well developed, well nourished, well groomed. No dysmorphic features. Eyes:  EOM normal      Neck:  Normal ROM, neck supple. HEENT:  Normocephalic atraumatic. Chest:  Respirations regular and unlabored. Psychiatric:  Normal behavior and appropriate affect      MENTAL STATUS  Orientation: Alert and oriented x 3  Fund of knowledge: Intact. Review of Systems:   Review of Systems  Constitutional: Negative. HENT: Negative. Eyes: Negative. Respiratory: Negative. Cardiovascular: Negative. Gastrointestinal: Negative. Endocrine: Negative. Genitourinary: Negative. Musculoskeletal: Negative. Skin: Negative. Allergic/Immunologic: Negative. Neurological: Positive for headaches. Hematological: Negative. Psychiatric/Behavioral: Negative. I personally reviewed the ROS entered by the MA      I have spent a total time of 47 minutes on 09/27/23 in caring for this patient including Diagnostic results, Prognosis, Risks and benefits of tx options, Instructions for management, Patient and family education, Risk factor reductions, Impressions, Counseling / Coordination of care, Documenting in the medical record, Reviewing / ordering tests, medicine, procedures   and Obtaining or reviewing history  .       Author:  Yonatan Beltran PA-C 9/27/2023 3:56 PM

## 2023-11-09 DIAGNOSIS — G47.50 PARASOMNIA, UNSPECIFIED TYPE: ICD-10-CM

## 2023-11-09 DIAGNOSIS — G47.00 INSOMNIA, UNSPECIFIED TYPE: ICD-10-CM

## 2023-11-09 RX ORDER — PRAZOSIN HYDROCHLORIDE 1 MG/1
CAPSULE ORAL
Qty: 300 CAPSULE | Refills: 2 | Status: SHIPPED | OUTPATIENT
Start: 2023-11-09

## 2023-12-16 DIAGNOSIS — G93.2 INTRACRANIAL HYPERTENSION: ICD-10-CM

## 2023-12-18 NOTE — TELEPHONE ENCOUNTER
Called [phone] and left a message on pt's answering machine of the below and for a call back.

## 2023-12-20 RX ORDER — ACETAZOLAMIDE 250 MG/1
250 TABLET ORAL 2 TIMES DAILY
Qty: 180 TABLET | Refills: 3 | Status: SHIPPED | OUTPATIENT
Start: 2023-12-20

## 2023-12-20 NOTE — TELEPHONE ENCOUNTER
Called pt and advised of the below. Pt states that she is taking diamox 125 mg 2 tabs bid. Agreeable to 250 mg tabs    Rx updated. Pls review and sign off    thanks

## 2024-01-11 DIAGNOSIS — G43.709 CHRONIC MIGRAINE WITHOUT AURA WITHOUT STATUS MIGRAINOSUS, NOT INTRACTABLE: ICD-10-CM

## 2024-01-11 RX ORDER — LAMOTRIGINE 100 MG/1
TABLET ORAL
Qty: 180 TABLET | Refills: 3 | Status: SHIPPED | OUTPATIENT
Start: 2024-01-11

## 2024-01-24 ENCOUNTER — OFFICE VISIT (OUTPATIENT)
Dept: NEUROLOGY | Facility: CLINIC | Age: 64
End: 2024-01-24
Payer: COMMERCIAL

## 2024-01-24 VITALS
SYSTOLIC BLOOD PRESSURE: 148 MMHG | DIASTOLIC BLOOD PRESSURE: 86 MMHG | WEIGHT: 163 LBS | HEART RATE: 72 BPM | BODY MASS INDEX: 27.98 KG/M2 | TEMPERATURE: 97.3 F

## 2024-01-24 DIAGNOSIS — G93.2 INTRACRANIAL HYPERTENSION: ICD-10-CM

## 2024-01-24 DIAGNOSIS — I10 ESSENTIAL HYPERTENSION: ICD-10-CM

## 2024-01-24 DIAGNOSIS — G43.709 CHRONIC MIGRAINE WITHOUT AURA WITHOUT STATUS MIGRAINOSUS, NOT INTRACTABLE: Primary | ICD-10-CM

## 2024-01-24 PROCEDURE — 99215 OFFICE O/P EST HI 40 MIN: CPT | Performed by: PHYSICIAN ASSISTANT

## 2024-01-24 RX ORDER — VENLAFAXINE HYDROCHLORIDE 150 MG/1
150 CAPSULE, EXTENDED RELEASE ORAL DAILY
COMMUNITY
Start: 2023-12-20

## 2024-01-24 RX ORDER — PROPRANOLOL HYDROCHLORIDE 120 MG/1
120 CAPSULE, EXTENDED RELEASE ORAL DAILY
COMMUNITY
Start: 2024-01-09

## 2024-01-24 RX ORDER — ROSUVASTATIN CALCIUM 10 MG/1
10 TABLET, COATED ORAL DAILY
COMMUNITY
Start: 2023-11-16 | End: 2024-11-15

## 2024-01-24 RX ORDER — ACETAZOLAMIDE 250 MG/1
TABLET ORAL
Qty: 270 TABLET | Refills: 3 | Status: SHIPPED | OUTPATIENT
Start: 2024-01-24

## 2024-01-24 RX ORDER — VENLAFAXINE HYDROCHLORIDE 75 MG/1
75 CAPSULE, EXTENDED RELEASE ORAL DAILY
COMMUNITY
Start: 2023-12-20

## 2024-01-24 RX ORDER — FAMOTIDINE 40 MG/1
40 TABLET, FILM COATED ORAL DAILY
COMMUNITY
Start: 2024-01-21

## 2024-01-24 RX ORDER — LOSARTAN POTASSIUM 50 MG/1
50 TABLET ORAL DAILY
COMMUNITY

## 2024-01-24 NOTE — PATIENT INSTRUCTIONS
Chronic migraine headache without aura:    Preventive therapy:  - continue magnesium 500 mg and vitamin B2 100 mg 1 tab daily  - cyproheptadine 4 mg at bedtime for weather related headahces  - Verapamil 80 mg three times a day  Continue meds for depression   - Qulipta 60 mg daily  - Prazosin 6 mg at bedtime.   - Acetazolamide 250 mg 1 tab twice a day (second dose late afternoon/evening) If you notice you are getting a weather type headache (or the weather is really bad!)  you can take up to 2 extra doses a day as needed  - Lamotrigine 100 mg twice a day  - If you (and/or PCP) want to decrease any medications from us, would recommend lamotrigine first.  Message me if that is something you wish to do so I can send in the right dose and weaning schedule    Abortive therapy for migraine headaches:   - at onset of migraine, take rizatriptan 10 mg.  May repeat in 2 hours if needed. Limit of 3 a week    IF not alleviated may use Compazine 10 mg     If in 2 hours her headache has not improved she has take Decadron 1 mg. She may also take cyproheptadine 4 mg at bedtime that night to help with headaches.

## 2024-01-24 NOTE — ASSESSMENT & PLAN NOTE
Currently managed by PCP and myself.  Patient is on Cozaar 50 mg daily, propranolol 120 mg daily and verapamil 80 mg 3 times a day.  Patient is slightly elevated today but limit this.  Therefore, would not want to go down on her blood pressure medicine prescribed, verapamil.

## 2024-01-24 NOTE — PROGRESS NOTES
[facility]  PATIENT:  Celeste Serna  MRN:  [id number]  :  1960  DATE OF SERVICE:  2024      Assessment/Plan:     Intracranial hypertension  Chronic migraine headache without aura:    Preventive therapy:  - continue magnesium 500 mg and vitamin B2 100 mg 1 tab daily  - cyproheptadine 4 mg at bedtime for weather related headahces  - Verapamil 80 mg three times a day  Continue meds for depression   - Qulipta 60 mg daily  - Prazosin 6 mg at bedtime.   - Acetazolamide 250 mg 1 tab twice a day (second dose late afternoon/evening) If you notice you are getting a weather type headache (or the weather is really bad!)  you can take up to 2 extra doses a day as needed  - Lamotrigine 100 mg twice a day  - If you (and/or PCP) want to decrease any medications from us, would recommend lamotrigine first.  Message me if that is something you wish to do so I can send in the right dose and weaning schedule    Abortive therapy for migraine headaches:   - at onset of migraine, take rizatriptan 10 mg.  May repeat in 2 hours if needed. Limit of 3 a week    IF not alleviated may use Compazine 10 mg     If in 2 hours her headache has not improved she has take Decadron 1 mg. She may also take cyproheptadine 4 mg at bedtime that night to help with headaches.    Headache, chronic migraine without aura  Chronic migraine headache without aura:    Preventive therapy:  - continue magnesium 500 mg and vitamin B2 100 mg 1 tab daily  - cyproheptadine 4 mg at bedtime for weather related headahces  - Verapamil 80 mg three times a day  Continue meds for depression   - Qulipta 60 mg daily  - Prazosin 6 mg at bedtime.   - Acetazolamide 250 mg 1 tab twice a day (second dose late afternoon/evening) If you notice you are getting a weather type headache (or the weather is really bad!)  you can take up to 2 extra doses a day as needed  - Lamotrigine 100 mg twice a day  - If you (and/or PCP) want to decrease any  medications from us, would recommend lamotrigine first.  Message me if that is something you wish to do so I can send in the right dose and weaning schedule    Abortive therapy for migraine headaches:   - at onset of migraine, take rizatriptan 10 mg.  May repeat in 2 hours if needed. Limit of 3 a week    IF not alleviated may use Compazine 10 mg     If in 2 hours her headache has not improved she has take Decadron 1 mg. She may also take cyproheptadine 4 mg at bedtime that night to help with headaches.    Essential hypertension  Currently managed by PCP and myself.  Patient is on Cozaar 50 mg daily, propranolol 120 mg daily and verapamil 80 mg 3 times a day.  Patient is slightly elevated today but limit this.  Therefore, would not want to go down on her blood pressure medicine prescribed, verapamil.         Problem List Items Addressed This Visit        Cardiovascular and Mediastinum    Headache, chronic migraine without aura - Primary     Chronic migraine headache without aura:    Preventive therapy:  - continue magnesium 500 mg and vitamin B2 100 mg 1 tab daily  - cyproheptadine 4 mg at bedtime for weather related headahces  - Verapamil 80 mg three times a day  Continue meds for depression   - Qulipta 60 mg daily  - Prazosin 6 mg at bedtime.   - Acetazolamide 250 mg 1 tab twice a day (second dose late afternoon/evening) If you notice you are getting a weather type headache (or the weather is really bad!)  you can take up to 2 extra doses a day as needed  - Lamotrigine 100 mg twice a day  - If you (and/or PCP) want to decrease any medications from us, would recommend lamotrigine first.  Message me if that is something you wish to do so I can send in the right dose and weaning schedule    Abortive therapy for migraine headaches:   - at onset of migraine, take rizatriptan 10 mg.  May repeat in 2 hours if needed. Limit of 3 a week    IF not alleviated may use Compazine 10 mg     If in 2 hours her headache has not  improved she has take Decadron 1 mg. She may also take cyproheptadine 4 mg at bedtime that night to help with headaches.         Relevant Medications    propranolol (INDERAL LA) 120 mg 24 hr capsule    venlafaxine (EFFEXOR-XR) 150 mg 24 hr capsule    venlafaxine (EFFEXOR-XR) 75 mg 24 hr capsule    Essential hypertension     Currently managed by PCP and myself.  Patient is on Cozaar 50 mg daily, propranolol 120 mg daily and verapamil 80 mg 3 times a day.  Patient is slightly elevated today but limit this.  Therefore, would not want to go down on her blood pressure medicine prescribed, verapamil.         Relevant Medications    losartan (COZAAR) 50 mg tablet    propranolol (INDERAL LA) 120 mg 24 hr capsule    acetaZOLAMIDE (DIAMOX) 250 mg tablet       Other    Intracranial hypertension     Chronic migraine headache without aura:    Preventive therapy:  - continue magnesium 500 mg and vitamin B2 100 mg 1 tab daily  - cyproheptadine 4 mg at bedtime for weather related headahces  - Verapamil 80 mg three times a day  Continue meds for depression   - Qulipta 60 mg daily  - Prazosin 6 mg at bedtime.   - Acetazolamide 250 mg 1 tab twice a day (second dose late afternoon/evening) If you notice you are getting a weather type headache (or the weather is really bad!)  you can take up to 2 extra doses a day as needed  - Lamotrigine 100 mg twice a day  - If you (and/or PCP) want to decrease any medications from us, would recommend lamotrigine first.  Message me if that is something you wish to do so I can send in the right dose and weaning schedule    Abortive therapy for migraine headaches:   - at onset of migraine, take rizatriptan 10 mg.  May repeat in 2 hours if needed. Limit of 3 a week    IF not alleviated may use Compazine 10 mg     If in 2 hours her headache has not improved she has take Decadron 1 mg. She may also take cyproheptadine 4 mg at bedtime that night to help with headaches.         Relevant Medications     acetaZOLAMIDE (DIAMOX) 250 mg tablet           History of Present Illness:   We had the pleasure of evaluating Celeste Serna in neurological follow up  today for headaches.  As you know,  she is a 63 y.o.  right handed female. She is a  and is here today for evaluation of her headaches.        Update as of 1/24/2024:  Headaches worsened 3 weeks ago.  For about a month (perhaps December 2023 denies having any headache at all).  Now she is having one today but first since last week (6/10).  Patient reports she has done so much better since last seen.  She has 0-3 headaches a week with most being very mild to moderate.  She has very rare severe headaches.    Her mom passed away in August 2022.  Today is her mom's birthday     Past medical history:  QTC: 411/25/2019 - 408   -GERD  - hypertension  - depression /anxiety     Depression/generalized anxiety/migraine update:   Since last seen patient states that due to COVID her anxiety is much worse.  COVID has also exacerbated her headaches. Her Mom fell and broke her femur and then was hospitalized in December.  Her mother moved to her sister's house in another state.  Wasn't talking to her because she thought she was going to have her killed.  Patient started to go to counseling over this.  She has put her dad's house up for sale and is having problems with her dad's girlfriend.  Can't get her injections anymore.  Last Emgality injection was in June 2021.       Couldn't continue the Emgality due to injection pain and discomfort     Internal tremor:   Pt states that after her flu vaccination in October 2019, she developed this internal tremor.  Describes it as a sensation of internal shaking but it time has noted that her arms and whole body shakes as well.      Onset: after the flu vaccination  Occurs: It tends to occurs 3-4 times a week.   Lasts: all day or at time will go into the next day  Trigger: none  She was given hydroxyzine 25mg  by her PCP and per  pt it did not help. She took it for few days intermittently.     Chronic Migraine headaches :  What medications do you take or have you taken for your headaches/mood/BP?      Current Preventive:   vitamin D3, vitamin B12, magnesium, B2  acetazoamide  cyproheptadine  Lamictal, Topamax  Seroquel, Clonazepam, Atarax, prazosin  Propranolol, verapamil  Qulipta     Current Abortive:   rizatriptan  Nurtec  Prochlorperazine  Ketorolac   Dexamethasone     Prior Preventative:  - Daith earring: in place and did help in the beginning and then did not thus pt took it out  - Botox (x 2 with no help),  - Toprol 100 mg, Propranolol  - Zoloft 100 mg, amitriptyline 10 mg, Venlafaxine,   - olanzapine 5 mg, risperidone 1 mg,  - gabapentin, Depakote   - Benadryl 50 mg, hydroxyzine 25 mg,  - Robaxin 1 g,  Vyepti  Emgality  Prior Abortive:   - Excedrin migraine,  - Imitrex  - DHE 1 mg (IV infusion -2018)  - Toradol 30/60 mg injection   - Prednisone (no headaches with steroids) she took it for a week,      What is your current pain? 3-4/10     How often do the headaches occur ?   Mild/moderate headache: 0-3 a week since December, prior was 3-4 a week  Severe:  2 times severe since last visit 9/2023; has only been 3 times in 3+ months wakes up almost daily with these now, 8-12 or more     Are you ever headache free? has been mostly headache free over the past 2 months (1 week with 3 headaches but others 0-2)        Aura/warning prior to headache onset? none     What time of the day do the headaches start? Varies but primarily begin in the am     How long do the headaches last: all day?   Mild headaches:  all day  severe headache:  Few hours with Maxalt as it works % of the time.  If this fails may last half of the day as she has to take the Decadron to aborted.  However if 10/10 with vomiting will last rest of the day     Where are they located? frontal, temporal and jaw pain     What is the intensity of pain?   Mild: 3-5/10  Severe:  8-10/10     Describe your usual headache? Throbbing, pressure, achy, shooting, stabbing     Associated symptoms (only with severe):   -  nausea, vomiting  -  photophobia, phonophobia, sensitivity to smell  -  concentration problems  -  light-headed or dizzy  -  prefer to be alone and in a dark room      Number of days missed per month because of headaches:  Work (or school) days:  2 days since last visit (9/2023)  Social or Family activities: none     Headache trigger: Fatigue, Stress/Tension, chocolate and turkey  Have you used CBD or THC for your headaches and how often? No  Are you current pregnant or planning on getting pregnant? No     Have you ever had any Brain imaging? yes        12/27/17 MRI C spine    IMPRESSION:Mild spondylotic changes are similar to the previous study.     12/27/17 MRI Brain  IMPRESSION:No acute infarction, intracranial hemorrhage or mass.  I personally reviewed these images.   Reviewed today with patient on 9/27/2023 there does appear to be some cerebellar medullary crowding some tonsillar pointing as well as some optic nerve pale and tortuosity.  There is slight empty sella as well     Reviewed old notes from physician seen in the past- see above HPI for summary of previous encounters.                Past Medical History:   Diagnosis Date   • Anxiety    • Depression    • Disease of thyroid gland    • History of transfusion    • Hypertension    • Migraine        Patient Active Problem List   Diagnosis   • Cellulitis   • Insomnia   • Headache, chronic migraine without aura   • Generalized anxiety disorder   • Essential hypertension   • GERD (gastroesophageal reflux disease)   • Tremor   • Parasomnia   • Migraine without aura and without status migrainosus, not intractable   • Intracranial hypertension       Medications:      Current Outpatient Medications   Medication Sig Dispense Refill   • acetaZOLAMIDE (DIAMOX) 250 mg tablet Take 1 tab in am and 1 tab in afternoon/early evening.  If  needed may add up to 2 additional doses during the day for a weather or sinus headache.  SO, can take 4 tabs a day if needed 270 tablet 3   • Atogepant (Qulipta) 60 MG TABS Take 60 mg by mouth in the morning 30 tablet 11   • Cholecalciferol (VITAMIN D3 PO) Take 1 tablet by mouth daily     • clonazePAM (KlonoPIN) 0.5 mg tablet Take 0.5 mg by mouth daily    0   • cyanocobalamin (VITAMIN B-12) 1,000 mcg tablet Take 1,000 mcg by mouth daily       • cyproheptadine (PERIACTIN) 4 mg tablet Take 1 tablet (4 mg total) by mouth as needed (weather headaches) 90 tablet 3   • dexamethasone (DECADRON) 1 mg tablet TAKE 1 TABLET BY MOUTH AT ONSET OF SEVERE HEADACHE WITH FOOD 10 tablet 5   • famotidine (PEPCID) 40 MG tablet Take 40 mg by mouth daily     • hydrOXYzine HCL (ATARAX) 25 mg tablet 1 tab at bedtime nightly 30 tablet 1   • lamoTRIgine (LaMICtal) 100 mg tablet take 1 tablet by mouth twice a day 180 tablet 3   • losartan (COZAAR) 50 mg tablet Take 50 mg by mouth daily     • Magnesium Oxide 250 MG TABS Take 1 tablet by mouth daily       • omeprazole (PriLOSEC) 40 MG capsule Take 40 mg by mouth daily  0   • prazosin (MINIPRESS) 1 mg capsule START WITH 1 TABLET AT BEDTIME FOR 2 TO 3 NIGHTS MAY INCREASE EVERY 2 TO 3 NIGHTS UNTIL EITHER SLEEPING BETTER OR LESS NIGHTMARES/DREAMS OR UP TO 10MG(10 CAPSULES) (Patient taking differently: Take 6 mg by mouth daily at bedtime START WITH 1 TABLET AT BEDTIME FOR 2 TO 3 NIGHTS MAY INCREASE EVERY 2 TO 3 NIGHTS UNTIL EITHER SLEEPING BETTER OR LESS NIGHTMARES/DREAMS OR UP TO 10MG(10 CAPSULES)) 300 capsule 2   • prochlorperazine (COMPAZINE) 10 mg tablet TAKE 1 TABLET AT ONSET OF MIGRAINE, CAN REPEAT IN 8 HOURS, CAN TAKE WITH TRIPTAN/NSAID 10 tablet 6   • propranolol (INDERAL LA) 120 mg 24 hr capsule Take 120 mg by mouth daily     • Riboflavin (VITAMIN B-2) 100 MG TABS Take 1 tablet by mouth daily       • rizatriptan (MAXALT-MLT) 10 mg disintegrating tablet Take 1 tablet (10 mg total) by mouth  as needed for migraine May repeat in 2 hours if needed.  Limit of 3 a week or 12 a month 12 tablet 6   • rosuvastatin (CRESTOR) 10 MG tablet Take 10 mg by mouth daily     • venlafaxine (EFFEXOR-XR) 150 mg 24 hr capsule Take 150 mg by mouth daily     • venlafaxine (EFFEXOR-XR) 75 mg 24 hr capsule Take 75 mg by mouth daily     • verapamil (CALAN) 80 mg tablet Take 1 tablet (80 mg total) by mouth 3 (three) times a day 270 tablet 3   • Red Yeast Rice Extract (RED YEAST RICE PO) Take 1 tablet by mouth daily (Patient not taking: Reported on 1/24/2024)     • triamcinolone (KENALOG) 0.1 % ointment Apply 1 application topically as needed  (Patient not taking: Reported on 1/24/2024)  0     No current facility-administered medications for this visit.        Allergies:      Allergies   Allergen Reactions   • Ceftazidime      [facility]   • Cefuroxime      [facility]  [facility]   • Oxymetazoline      [facility]   • Tetracycline Rash       Family History:     Family History   Problem Relation Age of Onset   • Hypertension Mother    • Bipolar disorder Family        Social History:     Social History     Socioeconomic History   • Marital status: Single     Spouse name: Not on file   • Number of children: Not on file   • Years of education: Not on file   • Highest education level: Not on file   Occupational History   • Not on file   Tobacco Use   • Smoking status: Every Day     Current packs/day: 0.25     Average packs/day: 0.3 packs/day for 10.0 years (2.5 ttl pk-yrs)     Types: Cigarettes   • Smokeless tobacco: Never   • Tobacco comments:     Pt. may be interested in future   Vaping Use   • Vaping status: Never Used   Substance and Sexual Activity   • Alcohol use: No   • Drug use: No   • Sexual activity: Yes     Partners: Male     Birth control/protection: Post-menopausal   Other Topics Concern   • Not on file   Social History Narrative   • Not on file     Social Determinants of Health     Financial  Resource Strain: Not on file   Food Insecurity: Not on file   Transportation Needs: Not on file   Physical Activity: Not on file   Stress: Not on file   Social Connections: Not on file   Intimate Partner Violence: Not on file   Housing Stability: Not on file    I have reviewed the patient's medical, social and surgical history as well as medications in detail and updated the computerized patient record.        Objective:   Physical Exam:                                                                   Vitals:            /86 (BP Location: Left arm, Patient Position: Sitting, Cuff Size: Standard)   Pulse 72   Temp (!) 97.3 °F (36.3 °C) (Temporal)   Wt 73.9 kg (163 lb)   BMI 27.98 kg/m²   BP Readings from Last 3 Encounters:   01/24/24 148/86   09/27/23 131/86   05/24/23 144/87     Pulse Readings from Last 3 Encounters:   01/24/24 72   09/27/23 64   05/24/23 66          CONSTITUTIONAL: Well developed, well nourished, well groomed. No dysmorphic features.     HEENT:  Normocephalic atraumatic.    Chest:  Respirations regular and unlabored.    Psychiatric:  Normal behavior and appropriate affect      MENTAL STATUS  Orientation: Alert and oriented x 3  Fund of knowledge: Intact.    MOTOR (Upper and lower extremities)   Bulk/tone/abnormal movement: Normal muscle bulk and tone.      COORDINATION   Station/Gait: Normal baseline gait.           Review of Systems:   Review of Systems  Constitutional:  Negative for appetite change and fever.   HENT: Negative.  Negative for hearing loss, tinnitus, trouble swallowing and voice change.    Eyes: Negative.  Negative for photophobia and pain.   Respiratory: Negative.  Negative for shortness of breath.    Cardiovascular: Negative.  Negative for palpitations.   Gastrointestinal: Negative.  Negative for nausea and vomiting.   Endocrine: Negative.  Negative for cold intolerance.   Genitourinary: Negative.  Negative for dysuria, frequency and urgency.   Musculoskeletal: Negative.   Negative for myalgias and neck pain.   Skin: Negative.  Negative for rash.   Neurological:  Positive for headaches. Negative for dizziness, tremors, seizures, syncope, facial asymmetry, speech difficulty, weakness, light-headedness and numbness.   Hematological: Negative.  Does not bruise/bleed easily.   Psychiatric/Behavioral: Negative.  Negative for confusion, hallucinations and sleep disturbance.    All other systems reviewed and are negative.  I personally reviewed the ROS entered by the MA      I have spent a total time of 42 minutes on 01/24/24 in caring for this patient including Prognosis, Risks and benefits of tx options, Instructions for management, Patient and family education, Importance of tx compliance, Risk factor reductions, Impressions, Counseling / Coordination of care, Documenting in the medical record, Reviewing / ordering tests, medicine, procedures  , and Obtaining or reviewing history  .      Author:  Sherine Hare PA-C 1/24/2024 3:38 PM     normal latch

## 2024-01-26 DIAGNOSIS — G47.00 INSOMNIA, UNSPECIFIED TYPE: ICD-10-CM

## 2024-01-26 DIAGNOSIS — G47.50 PARASOMNIA, UNSPECIFIED TYPE: ICD-10-CM

## 2024-01-26 RX ORDER — PRAZOSIN HYDROCHLORIDE 1 MG/1
6 CAPSULE ORAL
Qty: 540 CAPSULE | Refills: 3 | Status: SHIPPED | OUTPATIENT
Start: 2024-01-26

## 2024-04-15 NOTE — TELEPHONE ENCOUNTER
L ear pain x 2 days    Called [place] [facility] and scheduled her for 8/16 1:30pm-this was their first m-w or thurs that was available  Left detailed message for pt regarding infusion date  Date adjusted in beacon

## 2024-05-01 DIAGNOSIS — G43.709 CHRONIC MIGRAINE WITHOUT AURA WITHOUT STATUS MIGRAINOSUS, NOT INTRACTABLE: ICD-10-CM

## 2024-05-01 RX ORDER — PROCHLORPERAZINE MALEATE 10 MG
TABLET ORAL
Qty: 10 TABLET | Refills: 6 | Status: SHIPPED | OUTPATIENT
Start: 2024-05-01

## 2024-05-01 RX ORDER — RIZATRIPTAN BENZOATE 10 MG/1
TABLET, ORALLY DISINTEGRATING ORAL
Qty: 12 TABLET | Refills: 6 | Status: SHIPPED | OUTPATIENT
Start: 2024-05-01

## 2024-05-14 DIAGNOSIS — G43.009 MIGRAINE WITHOUT AURA AND WITHOUT STATUS MIGRAINOSUS, NOT INTRACTABLE: ICD-10-CM

## 2024-05-14 RX ORDER — VERAPAMIL HYDROCHLORIDE 80 MG/1
80 TABLET ORAL 3 TIMES DAILY
Qty: 270 TABLET | Refills: 3 | Status: SHIPPED | OUTPATIENT
Start: 2024-05-14

## 2024-07-07 DIAGNOSIS — G43.009 MIGRAINE WITHOUT AURA AND WITHOUT STATUS MIGRAINOSUS, NOT INTRACTABLE: ICD-10-CM

## 2024-07-08 ENCOUNTER — TELEPHONE (OUTPATIENT)
Age: 64
End: 2024-07-08

## 2024-07-08 RX ORDER — ATOGEPANT 60 MG/1
1 TABLET ORAL EVERY MORNING
Qty: 30 TABLET | Refills: 11 | Status: SHIPPED | OUTPATIENT
Start: 2024-07-08

## 2024-07-08 NOTE — TELEPHONE ENCOUNTER
Qulipta is not covered and needs a prior authorization.     It was sent to cover my med already. Pharmacist can be called back at [phone].

## 2024-07-09 NOTE — TELEPHONE ENCOUNTER
Chart reviewed  Per enc 23-PA  24    The key # on CMM was for [facility].    PA needs to be sent to [employer]. Will create a new key    Urgent Qulipta PA initiated on CMM. Key: [id number]    Awaiting determination

## 2024-07-11 NOTE — TELEPHONE ENCOUNTER
Called [facility] pharmacy and left a detailed message on their answering machine of the below and for a call back if any questions.

## 2024-07-31 DIAGNOSIS — G43.709 CHRONIC MIGRAINE WITHOUT AURA WITHOUT STATUS MIGRAINOSUS, NOT INTRACTABLE: ICD-10-CM

## 2024-07-31 RX ORDER — CYPROHEPTADINE HYDROCHLORIDE 4 MG/1
TABLET ORAL
Qty: 90 TABLET | Refills: 1 | Status: SHIPPED | OUTPATIENT
Start: 2024-07-31

## 2025-01-06 DIAGNOSIS — G47.50 PARASOMNIA, UNSPECIFIED TYPE: ICD-10-CM

## 2025-01-06 DIAGNOSIS — G43.709 CHRONIC MIGRAINE WITHOUT AURA WITHOUT STATUS MIGRAINOSUS, NOT INTRACTABLE: ICD-10-CM

## 2025-01-06 DIAGNOSIS — G47.00 INSOMNIA, UNSPECIFIED TYPE: ICD-10-CM

## 2025-01-08 DIAGNOSIS — Z51.81 THERAPEUTIC DRUG MONITORING: Primary | ICD-10-CM

## 2025-01-08 RX ORDER — PRAZOSIN HYDROCHLORIDE 1 MG/1
CAPSULE ORAL
Qty: 540 CAPSULE | Refills: 3 | Status: SHIPPED | OUTPATIENT
Start: 2025-01-08

## 2025-01-08 RX ORDER — LAMOTRIGINE 100 MG/1
100 TABLET ORAL 2 TIMES DAILY
Qty: 180 TABLET | Refills: 3 | Status: SHIPPED | OUTPATIENT
Start: 2025-01-08

## 2025-01-08 NOTE — TELEPHONE ENCOUNTER
Called and advised pt that lamotrigine and minipress scripts were sent to [facility] pharmacy on file. And advised of the below. Can go at any  lab facility. CMP requires at least 8 hr fasting. Pt verbalized understanding.

## 2025-01-09 DIAGNOSIS — G43.709 CHRONIC MIGRAINE WITHOUT AURA WITHOUT STATUS MIGRAINOSUS, NOT INTRACTABLE: ICD-10-CM

## 2025-01-10 RX ORDER — PROCHLORPERAZINE MALEATE 10 MG
TABLET ORAL
Qty: 10 TABLET | Refills: 6 | Status: SHIPPED | OUTPATIENT
Start: 2025-01-10

## 2025-01-17 ENCOUNTER — APPOINTMENT (OUTPATIENT)
Dept: LAB | Facility: CLINIC | Age: 65
End: 2025-01-17
Payer: COMMERCIAL

## 2025-01-17 DIAGNOSIS — Z51.81 THERAPEUTIC DRUG MONITORING: ICD-10-CM

## 2025-01-17 LAB
ALBUMIN SERPL BCG-MCNC: 4.3 G/DL (ref 3.5–5)
ALP SERPL-CCNC: 47 U/L (ref 34–104)
ALT SERPL W P-5'-P-CCNC: 20 U/L (ref 7–52)
ANION GAP SERPL CALCULATED.3IONS-SCNC: 5 MMOL/L (ref 4–13)
AST SERPL W P-5'-P-CCNC: 21 U/L (ref 13–39)
BASOPHILS # BLD AUTO: 0.04 THOUSANDS/ΜL (ref 0–0.1)
BASOPHILS NFR BLD AUTO: 1 % (ref 0–1)
BILIRUB SERPL-MCNC: 0.3 MG/DL (ref 0.2–1)
BUN SERPL-MCNC: 17 MG/DL (ref 5–25)
CALCIUM SERPL-MCNC: 9.6 MG/DL (ref 8.4–10.2)
CHLORIDE SERPL-SCNC: 108 MMOL/L (ref 96–108)
CO2 SERPL-SCNC: 28 MMOL/L (ref 21–32)
CREAT SERPL-MCNC: 0.92 MG/DL (ref 0.6–1.3)
EOSINOPHIL # BLD AUTO: 0.23 THOUSAND/ΜL (ref 0–0.61)
EOSINOPHIL NFR BLD AUTO: 5 % (ref 0–6)
ERYTHROCYTE [DISTWIDTH] IN BLOOD BY AUTOMATED COUNT: 12.5 % (ref 11.6–15.1)
GFR SERPL CREATININE-BSD FRML MDRD: 66 ML/MIN/1.73SQ M
GLUCOSE P FAST SERPL-MCNC: 100 MG/DL (ref 65–99)
HCT VFR BLD AUTO: 39.7 % (ref 34.8–46.1)
HGB BLD-MCNC: 13 G/DL (ref 11.5–15.4)
IMM GRANULOCYTES # BLD AUTO: 0.01 THOUSAND/UL (ref 0–0.2)
IMM GRANULOCYTES NFR BLD AUTO: 0 % (ref 0–2)
LYMPHOCYTES # BLD AUTO: 1.74 THOUSANDS/ΜL (ref 0.6–4.47)
LYMPHOCYTES NFR BLD AUTO: 41 % (ref 14–44)
MCH RBC QN AUTO: 32.3 PG (ref 26.8–34.3)
MCHC RBC AUTO-ENTMCNC: 32.7 G/DL (ref 31.4–37.4)
MCV RBC AUTO: 99 FL (ref 82–98)
MONOCYTES # BLD AUTO: 0.52 THOUSAND/ΜL (ref 0.17–1.22)
MONOCYTES NFR BLD AUTO: 12 % (ref 4–12)
NEUTROPHILS # BLD AUTO: 1.76 THOUSANDS/ΜL (ref 1.85–7.62)
NEUTS SEG NFR BLD AUTO: 41 % (ref 43–75)
NRBC BLD AUTO-RTO: 0 /100 WBCS
PLATELET # BLD AUTO: 287 THOUSANDS/UL (ref 149–390)
PMV BLD AUTO: 10 FL (ref 8.9–12.7)
POTASSIUM SERPL-SCNC: 4.2 MMOL/L (ref 3.5–5.3)
PROT SERPL-MCNC: 7 G/DL (ref 6.4–8.4)
RBC # BLD AUTO: 4.03 MILLION/UL (ref 3.81–5.12)
SODIUM SERPL-SCNC: 141 MMOL/L (ref 135–147)
WBC # BLD AUTO: 4.3 THOUSAND/UL (ref 4.31–10.16)

## 2025-01-17 PROCEDURE — 36415 COLL VENOUS BLD VENIPUNCTURE: CPT

## 2025-01-17 PROCEDURE — 85025 COMPLETE CBC W/AUTO DIFF WBC: CPT

## 2025-01-17 PROCEDURE — 80053 COMPREHEN METABOLIC PANEL: CPT

## 2025-01-20 ENCOUNTER — RESULTS FOLLOW-UP (OUTPATIENT)
Dept: NEUROLOGY | Facility: CLINIC | Age: 65
End: 2025-01-20

## 2025-03-20 DIAGNOSIS — G43.709 CHRONIC MIGRAINE WITHOUT AURA WITHOUT STATUS MIGRAINOSUS, NOT INTRACTABLE: ICD-10-CM

## 2025-03-21 RX ORDER — RIZATRIPTAN BENZOATE 10 MG/1
TABLET, ORALLY DISINTEGRATING ORAL
Qty: 12 TABLET | Refills: 6 | Status: SHIPPED | OUTPATIENT
Start: 2025-03-21

## 2025-06-27 ENCOUNTER — TELEPHONE (OUTPATIENT)
Age: 65
End: 2025-06-27

## 2025-07-02 ENCOUNTER — OFFICE VISIT (OUTPATIENT)
Dept: NEUROLOGY | Facility: CLINIC | Age: 65
End: 2025-07-02
Payer: COMMERCIAL

## 2025-07-02 VITALS
HEIGHT: 64 IN | WEIGHT: 164.9 LBS | DIASTOLIC BLOOD PRESSURE: 86 MMHG | BODY MASS INDEX: 28.15 KG/M2 | HEART RATE: 79 BPM | TEMPERATURE: 98 F | SYSTOLIC BLOOD PRESSURE: 122 MMHG

## 2025-07-02 DIAGNOSIS — G43.009 MIGRAINE WITHOUT AURA AND WITHOUT STATUS MIGRAINOSUS, NOT INTRACTABLE: ICD-10-CM

## 2025-07-02 DIAGNOSIS — G43.709 CHRONIC MIGRAINE WITHOUT AURA WITHOUT STATUS MIGRAINOSUS, NOT INTRACTABLE: Primary | ICD-10-CM

## 2025-07-02 DIAGNOSIS — G93.2 INTRACRANIAL HYPERTENSION: ICD-10-CM

## 2025-07-02 DIAGNOSIS — E66.9 OBESITY: ICD-10-CM

## 2025-07-02 PROCEDURE — 99215 OFFICE O/P EST HI 40 MIN: CPT | Performed by: PHYSICIAN ASSISTANT

## 2025-07-02 RX ORDER — DEXAMETHASONE 4 MG/1
TABLET ORAL
Qty: 9 TABLET | Refills: 0 | Status: SHIPPED | OUTPATIENT
Start: 2025-07-02

## 2025-07-02 RX ORDER — CYPROHEPTADINE HYDROCHLORIDE 4 MG/1
4 TABLET ORAL
Qty: 90 TABLET | Refills: 1 | Status: SHIPPED | OUTPATIENT
Start: 2025-07-02

## 2025-07-02 RX ORDER — ACETAZOLAMIDE 250 MG/1
TABLET ORAL
Qty: 270 TABLET | Refills: 3 | Status: SHIPPED | OUTPATIENT
Start: 2025-07-02

## 2025-07-02 RX ORDER — DIHYDROERGOTAMINE MESYLATE 4 MG/ML
0.72 SPRAY, METERED NASAL AS NEEDED
Qty: 8 ML | Refills: 11 | Status: SHIPPED | OUTPATIENT
Start: 2025-07-02 | End: 2025-07-09

## 2025-07-02 NOTE — PATIENT INSTRUCTIONS
Chronic migraine headache without aura:    Preventive therapy:  continue magnesium 500 mg and vitamin B2 100 mg 1 tab daily  Verapamil 80 mg three times a day  Continue meds for depression   Qulipta 60 mg daily  Prazosin 6 mg at bedtime. --as needd  - Acetazolamide 250 mg 1 tab twice a day (second dose late afternoon/evening)  for 7 days.  If no improvement increase to 3 times a day  If you notice you are getting a weather type headache (or the weather is really bad!)  you can take up to 2 extra doses a day as needed  - Lamotrigine 100 mg twice a day  - If you (and/or PCP) want to decrease any medications from us, would recommend lamotrigine first.  Message me if that is something you wish to do so I can send in the right dose and weaning schedule    Abortive therapy for migraine headaches:   - At onset of severe headache, take Trudhesa 1 spray each nostril.  May repeat in 1-2 hours if needed.  Do not use rizatriptan within 24 hours  - If mild or moderate, at onset of migraine, take rizatriptan 10 mg.  May repeat in 2 hours if needed. Limit of 3 a week    IF not alleviated may use Compazine 10 mg     For next 5 days  Decadron 4 mg take 2 tabs for 2 days then 1 tab for 3-5 days  For next 7+ nights take cyproheptadine 4 mg at bedtime

## 2025-07-02 NOTE — PROGRESS NOTES
Name: Celeste Serna      : 1960      MRN: [id number]  Encounter Provider: Sherine Hare PA-C  Encounter Date: 2025   Encounter department: NEUROLOGY [facility]  :  Assessment & Plan  Chronic migraine without aura without status migrainosus, not intractable  Preventive therapy:  continue magnesium 500 mg and vitamin B2 100 mg 1 tab daily  Verapamil 80 mg three times a day  Continue meds for depression   Qulipta 60 mg daily  Prazosin 6 mg at bedtime. --as needd  - Acetazolamide 250 mg 1 tab twice a day (second dose late afternoon/evening)  for 7 days.  If no improvement increase to 3 times a day  If you notice you are getting a weather type headache (or the weather is really bad!)  you can take up to 2 extra doses a day as needed  - Lamotrigine 100 mg twice a day  - If you (and/or PCP) want to decrease any medications from us, would recommend lamotrigine first.  Message me if that is something you wish to do so I can send in the right dose and weaning schedule    Abortive therapy for migraine headaches:   - At onset of severe headache, take Trudhesa 1 spray each nostril.  May repeat in 1-2 hours if needed.  Do not use rizatriptan within 24 hours  - If mild or moderate, at onset of migraine, take rizatriptan 10 mg.  May repeat in 2 hours if needed. Limit of 3 a week    IF not alleviated may use Compazine 10 mg   Orders:  •  dexamethasone (DECADRON) 4 mg tablet; Take 2 tabs for 2 days then take 1 tab for 3 days  •  cyproheptadine (PERIACTIN) 4 mg tablet; Take 1 tablet (4 mg total) by mouth daily at bedtime    Obesity  Referral to weight management  Orders:  •  Ambulatory Referral to Weight Management; Future    Intracranial hypertension  - Acetazolamide 250 mg 1 tab twice a day (second dose late afternoon/evening)  for 7 days.  If no improvement increase to 3 times a day  If you notice you are getting a weather type headache (or the weather is really bad!)  you can take up to 2 extra  doses a day as needed    Orders:  •  acetaZOLAMIDE (DIAMOX) 250 mg tablet; Take 1 tab in am and 1 tab in afternoon/early evening.  If needed may add up to 2 additional doses during the day for a weather or sinus headache.  SO, can take 4 tabs a day if needed  •  CBC (Includes Diff/Plt) (Refl); Future  •  Comprehensive metabolic panel; Future    Migraine without aura and without status migrainosus, not intractable    Orders:  •  Dihydroergotamine Mesylate HFA (Trudhesa) 0.725 MG/ACT AERS; 0.725 mg into each nostril if needed (migraine) Use at onset of migraine.  May repeat in 1 hour if needed.  Limit of 3 a week or 8 a month.  Do not use within 24 hours of a triptan.          History of Present Illness   HPI   We had the pleasure of evaluating Celeste Serna in neurological follow up  today for headaches.  As you know,  she is a 65 y.o.  right handed female with pmhx of insomnia, anxiety, hypertension, GERD, tremor, parasomnia and cellulitis. She is a  and is here today for evaluation of her headaches.         Update as of 1/24/2024:  Headaches worsened 3 weeks ago.  For about a month (perhaps December 2023 denies having any headache at all).  Now she is having one today but first since last week (6/10).  Patient reports she has done so much better since last seen.  She has 0-3 headaches a week with most being very mild to moderate.  She has very rare severe headaches.    Interval update as of 7/2/2025:  Patient has worsened in the number of severe migraine she is getting.  She is currently getting 2-3 severe a week.  Approximately 75 to 80% of these are alleviated with the rizatriptan but, there are a few which are not.     Her mom passed away in August 2022.       Past medical history:  QTC: 411/25/2019 - 408      Depression/generalized anxiety/migraine update:   Since last seen patient states that due to COVID her anxiety is much worse.  COVID has also exacerbated her headaches. Her Mom fell and broke  her femur and then was hospitalized in December.  Her mother moved to her sister's house in another state.  Wasn't talking to her because she thought she was going to have her killed.  Patient started to go to counseling over this.  She has put her dad's house up for sale and is having problems with her dad's girlfriend.  Can't get her injections anymore.  Last Emgality injection was in June 2021.       Couldn't continue the Emgality due to injection pain and discomfort     Internal tremor:   Pt states that after her flu vaccination in October 2019, she developed this internal tremor.  Describes it as a sensation of internal shaking but it time has noted that her arms and whole body shakes as well.      Onset: after the flu vaccination  Occurs: It tends to occurs 3-4 times a week.   Lasts: all day or at time will go into the next day  Trigger: none  She was given hydroxyzine 25mg  by her PCP and per pt it did not help. She took it for few days intermittently.     Chronic Migraine headaches :  What medications do you take or have you taken for your headaches/mood/BP?      Current Preventive:   vitamin D3, vitamin B12, magnesium, B2  Acetazolamide--did not start  cyproheptadine  Lamictal, Topamax  Clonazepam, Atarax, prazosin, venlafaxine  Propranolol, verapamil losartan  Qulipta     Current Abortive:   rizatriptan  Prochlorperazine  Ketorolac   Dexamethasone     Prior Preventative:  Daith earring: in place and did help in the beginning and then did not thus pt took it out  Botox (x 2 with no help),  Toprol 100 mg, Zoloft 100 mg, amitriptyline 10 mg,  Seroquel,   olanzapine 5 mg, risperidone 1 mg,  gabapentin, Depakote   benadryl 50 mg,   Robaxin 1 g,  Vyepti  Emgality    Prior Abortive:   Excedrin migraine,  Imitrex  Nurtec  DHE 1 mg (IV infusion -2018)  Toradol 30/60 mg injection  Prednisone (no headaches with steroids) she took it for a week,      What is your current pain? 2/10     How often do the headaches occur  ?   Mild/moderate headache: 4 a week  Severe:  3-4 times a week; prior was 2 times severe since last visit 9/2023; has only been 3 times in 3+ months wakes up almost daily with these now, 8-12 or more     Are you ever headache free? has been mostly headache free over the past 2 months (1 week with 3 headaches but others 0-2)         Aura/warning prior to headache onset? none     What time of the day do the headaches start? Varies but primarily begin in the am     How long do the headaches last: all day?   Mild headaches:  all day  severe headache:  Few hours with Maxalt as it works % of the time.  If this fails may last half of the day as she has to take the Decadron to aborted.  However if 10/10 with vomiting will last rest of the day     Where are they located? frontal, temporal and jaw pain     What is the intensity of pain?   Mild: 3-5/10  Severe: 8-10/10     Describe your usual headache? Throbbing, pressure, achy, shooting, stabbing     Associated symptoms (only with severe):   -  nausea, vomiting  -  photophobia, phonophobia, sensitivity to smell  -  concentration problems  -  light-headed or dizzy  -  prefer to be alone and in a dark room      Number of days missed per month because of headaches:  Work (or school) days:  2 days since last visit (9/2023)  Social or Family activities: none     Headache trigger: Fatigue, Stress/Tension, chocolate and turkey  Have you used CBD or THC for your headaches and how often? No  Are you current pregnant or planning on getting pregnant? No     Have you ever had any Brain imaging? yes        12/27/17 MRI C spine    IMPRESSION:Mild spondylotic changes are similar to the previous study.     12/27/17 MRI Brain  IMPRESSION:No acute infarction, intracranial hemorrhage or mass.  I personally reviewed these images.   Reviewed today with patient on 9/27/2023 there does appear to be some cerebellar medullary crowding some tonsillar pointing as well as some optic nerve pale  "and tortuosity.  There is slight empty sella as well     Reviewed old notes from physician seen in the past- see above HPI for summary of previous encounter    Review of Systems I have personally reviewed the MA's review of systems and made changes as necessary.         Objective   /86 (BP Location: Left arm, Patient Position: Sitting, Cuff Size: Standard)   Pulse 79   Temp 98 °F (36.7 °C) (Temporal)   Ht 5' 4\" (1.626 m)   Wt 74.8 kg (164 lb 14.4 oz)   BMI 28.31 kg/m²     Physical Exam  Neurological Exam  CONSTITUTIONAL: Well developed, well nourished, well groomed. No dysmorphic features.     HEENT:  Normocephalic atraumatic.    Chest:  Respirations regular and unlabored.    Psychiatric:  Normal behavior and appropriate affect      MENTAL STATUS  Orientation: Alert and oriented x 3  Fund of knowledge: Intact.        Administrative Statements   I have spent a total time of 47 minutes in caring for this patient on the day of the visit/encounter including Prognosis, Risks and benefits of tx options, Instructions for management, Patient and family education, Risk factor reductions, Impressions, Counseling / Coordination of care, Documenting in the medical record, Reviewing/placing orders in the medical record (including tests, medications, and/or procedures), and Obtaining or reviewing history  .  "

## 2025-07-02 NOTE — ASSESSMENT & PLAN NOTE
Preventive therapy:  continue magnesium 500 mg and vitamin B2 100 mg 1 tab daily  Verapamil 80 mg three times a day  Continue meds for depression   Qulipta 60 mg daily  Prazosin 6 mg at bedtime. --as needd  - Acetazolamide 250 mg 1 tab twice a day (second dose late afternoon/evening)  for 7 days.  If no improvement increase to 3 times a day  If you notice you are getting a weather type headache (or the weather is really bad!)  you can take up to 2 extra doses a day as needed  - Lamotrigine 100 mg twice a day  - If you (and/or PCP) want to decrease any medications from us, would recommend lamotrigine first.  Message me if that is something you wish to do so I can send in the right dose and weaning schedule    Abortive therapy for migraine headaches:   - At onset of severe headache, take Trudhesa 1 spray each nostril.  May repeat in 1-2 hours if needed.  Do not use rizatriptan within 24 hours  - If mild or moderate, at onset of migraine, take rizatriptan 10 mg.  May repeat in 2 hours if needed. Limit of 3 a week    IF not alleviated may use Compazine 10 mg   Orders:  •  dexamethasone (DECADRON) 4 mg tablet; Take 2 tabs for 2 days then take 1 tab for 3 days  •  cyproheptadine (PERIACTIN) 4 mg tablet; Take 1 tablet (4 mg total) by mouth daily at bedtime

## 2025-07-02 NOTE — ASSESSMENT & PLAN NOTE
- Acetazolamide 250 mg 1 tab twice a day (second dose late afternoon/evening)  for 7 days.  If no improvement increase to 3 times a day  If you notice you are getting a weather type headache (or the weather is really bad!)  you can take up to 2 extra doses a day as needed    Orders:  •  acetaZOLAMIDE (DIAMOX) 250 mg tablet; Take 1 tab in am and 1 tab in afternoon/early evening.  If needed may add up to 2 additional doses during the day for a weather or sinus headache.  SO, can take 4 tabs a day if needed  •  CBC (Includes Diff/Plt) (Refl); Future  •  Comprehensive metabolic panel; Future

## 2025-07-03 ENCOUNTER — TELEPHONE (OUTPATIENT)
Age: 65
End: 2025-07-03

## 2025-07-03 DIAGNOSIS — G43.009 MIGRAINE WITHOUT AURA AND WITHOUT STATUS MIGRAINOSUS, NOT INTRACTABLE: Primary | ICD-10-CM

## 2025-07-03 NOTE — TELEPHONE ENCOUNTER
PA for Dihydroergotamine Mesylate HFA (Trudhesa) 0.725 MG/ACT AERS SUBMITTED to [employer]    via    [x]CMM-KEY: [id number]  []Surescripts-Case ID #   []Availity-Auth ID # NDC #   []Faxed to plan   []Other website   []Phone call Case ID #     [x]PA sent as URGENT    All office notes, labs and other pertaining documents and studies sent. Clinical questions answered. Awaiting determination from insurance company.     Turnaround time for your insurance to make a decision on your Prior Authorization can take 7-21 business days.

## 2025-07-07 NOTE — TELEPHONE ENCOUNTER
TRUE Francis to Me  Neurology Concussion & Migraine Team 5        7/7/25  1:46 PM  Hey, I am covering for sarika today.  It looks like it was denied because she has not tried a generic nasal spray like zomig first.  If the patient prefers to wait until next week when sarika returns to discuss that is fine.  If she would like something that she can try now, I can send the zomig nasal spray for her.

## 2025-07-07 NOTE — TELEPHONE ENCOUNTER
PA for Trudhesa 0.725 MG/ACT AERS DENIED    Reason:(Screenshot if applicable)        Message sent to office clinical pool Yes    Denial letter scanned into Media Yes    We can gladly do an appeal but the process can take about 30-60 days to provide determination. Please have the office staff schedule a Peer to Peer at phone [phone] . If an appeal is truly warranted please have Provider send clinical documentation to the PA department to support the appeal.     **Please follow up with your patient regarding denial and next steps**

## 2025-07-09 RX ORDER — ZOLMITRIPTAN 5 MG/1
SPRAY NASAL
Qty: 6 EACH | Refills: 3 | Status: SHIPPED | OUTPATIENT
Start: 2025-07-09 | End: 2025-07-09 | Stop reason: SDUPTHER

## 2025-07-09 NOTE — TELEPHONE ENCOUNTER
Patient returning missed call. Patient stated that she did not listen to the message that was left, she just redialed the number.      Patient was informed the denial and offer to send in a script for zomig nasal spray.    Patient ask for that script to sent in please.      Please call when its been submitted. Thank you.

## 2025-07-09 NOTE — TELEPHONE ENCOUNTER
Zomig NS should not need to be sent to specialty pharm.    I entered script to be sent to local pharm.  Please sign off

## 2025-07-09 NOTE — TELEPHONE ENCOUNTER
Called [facility], technician stated that ZOMIG 5 MG nasal solution is a specialty drug and it will not be processed at their pharmacy. It will be sent to a speciality pharmacy, so he could not tell me if it will need a PA. He could not tell me what specialty pharmacy it will go to.   If it requires a PA the specialty pharmacy will call the office.

## 2025-07-09 NOTE — TELEPHONE ENCOUNTER
PA for ZOLMitriptan 5 mg solution SUBMITTED to [employer]     via    [x]CMM-KEY: [id number]  []Surescripts-Case ID #   []Availity-Auth ID # NDC #   []Faxed to plan   []Other website   []Phone call Case ID #     []PA sent as URGENT    All office notes, labs and other pertaining documents and studies sent. Clinical questions answered. Awaiting determination from insurance company.     Turnaround time for your insurance to make a decision on your Prior Authorization can take 7-21 business days.

## 2025-07-10 RX ORDER — SUMATRIPTAN 20 MG/1
SPRAY NASAL
Qty: 6 EACH | Refills: 0 | Status: SHIPPED | OUTPATIENT
Start: 2025-07-10

## 2025-07-10 RX ORDER — ZOLMITRIPTAN 5 MG/1
SPRAY NASAL
Qty: 6 EACH | Refills: 3 | Status: SHIPPED | OUTPATIENT
Start: 2025-07-10 | End: 2025-07-10

## 2025-07-10 NOTE — TELEPHONE ENCOUNTER
Called and advised pt of all of the below. She verbalized understanding. Agreeable to try sumatriptan NS. Requesting script be sent to [facility] on file    thanks

## 2025-07-10 NOTE — TELEPHONE ENCOUNTER
PA for ZOLMitriptan 5 mg solution DENIED    Reason:(Screenshot if applicable)        Message sent to office clinical providerYes    Denial letter scanned into Media Yes    We can gladly do an appeal but the process can take about 30-60 days to provide determination. Please have the office staff schedule a Peer to Peer at phone [phone]   . If an appeal is truly warranted please have Provider send clinical documentation to the PA department to support the appeal.     **Please follow up with your patient regarding denial and next steps**

## 2025-07-14 ENCOUNTER — TELEPHONE (OUTPATIENT)
Age: 65
End: 2025-07-14

## 2025-07-17 NOTE — TELEPHONE ENCOUNTER
PA for SUBMITTED to     via    [x][facility]-KEY: [id number]    PA Case ID #: [id number]    [x]PA sent as URGENT    All office notes, labs and other pertaining documents and studies sent. Clinical questions answered. Awaiting determination from insurance company.     Turnaround time for your insurance to make a decision on your Prior Authorization can take 7-21 business days.

## 2025-07-18 NOTE — TELEPHONE ENCOUNTER
PA for SUMAtriptan 20 MG/ACT nasal spray APPROVED     Date(s) approved 5/17/2025-7/16/2026    Case #[id number]    Patient advised by          [][employer] Message  [x]Phone call   []LMOM  []L/M to call office as no active Communication consent on file  []Unable to leave detailed message as VM not approved on Communication consent       Pharmacy advised by    [x]Fax  []Phone call  []Secure Chat         Approval letter scanned into Media Yes

## 2025-07-29 ENCOUNTER — TELEPHONE (OUTPATIENT)
Age: 65
End: 2025-07-29

## 2025-07-29 ENCOUNTER — OFFICE VISIT (OUTPATIENT)
Dept: BARIATRICS | Facility: CLINIC | Age: 65
End: 2025-07-29
Attending: PHYSICIAN ASSISTANT
Payer: COMMERCIAL

## 2025-07-29 VITALS
BODY MASS INDEX: 28.24 KG/M2 | OXYGEN SATURATION: 98 % | WEIGHT: 165.4 LBS | HEART RATE: 76 BPM | HEIGHT: 64 IN | SYSTOLIC BLOOD PRESSURE: 126 MMHG | DIASTOLIC BLOOD PRESSURE: 80 MMHG

## 2025-07-29 DIAGNOSIS — E78.2 MIXED HYPERLIPIDEMIA: ICD-10-CM

## 2025-07-29 DIAGNOSIS — E66.9 OBESITY: ICD-10-CM

## 2025-07-29 DIAGNOSIS — G43.009 MIGRAINE WITHOUT AURA AND WITHOUT STATUS MIGRAINOSUS, NOT INTRACTABLE: ICD-10-CM

## 2025-07-29 DIAGNOSIS — I10 ESSENTIAL HYPERTENSION: ICD-10-CM

## 2025-07-29 DIAGNOSIS — E66.3 OVERWEIGHT: Primary | ICD-10-CM

## 2025-07-29 PROCEDURE — 99204 OFFICE O/P NEW MOD 45 MIN: CPT | Performed by: PHYSICAL THERAPIST

## 2025-07-29 RX ORDER — TOPIRAMATE 25 MG/1
25 TABLET, FILM COATED ORAL 2 TIMES DAILY
Qty: 180 TABLET | Refills: 1 | Status: SHIPPED | OUTPATIENT
Start: 2025-07-29 | End: 2025-07-29

## 2025-07-30 RX ORDER — ATOGEPANT 60 MG/1
1 TABLET ORAL DAILY
Qty: 30 TABLET | Refills: 11 | Status: SHIPPED | OUTPATIENT
Start: 2025-07-30

## 2025-08-06 ENCOUNTER — CLINICAL SUPPORT (OUTPATIENT)
Dept: BARIATRICS | Facility: CLINIC | Age: 65
End: 2025-08-06

## 2025-08-06 VITALS — BODY MASS INDEX: 28.13 KG/M2 | HEIGHT: 64 IN | WEIGHT: 164.8 LBS

## 2025-08-06 DIAGNOSIS — R63.5 ABNORMAL WEIGHT GAIN: Primary | ICD-10-CM

## 2025-08-06 PROCEDURE — RECHECK

## 2025-08-12 ENCOUNTER — TELEPHONE (OUTPATIENT)
Age: 65
End: 2025-08-12

## 2025-08-20 ENCOUNTER — CLINICAL SUPPORT (OUTPATIENT)
Dept: BARIATRICS | Facility: CLINIC | Age: 65
End: 2025-08-20

## 2025-08-20 VITALS — BODY MASS INDEX: 27.55 KG/M2 | WEIGHT: 161.4 LBS | HEIGHT: 64 IN

## 2025-08-20 DIAGNOSIS — R63.5 ABNORMAL WEIGHT GAIN: Primary | ICD-10-CM

## 2025-08-20 PROCEDURE — RECHECK

## 2025-08-21 VITALS — HEIGHT: 64 IN | WEIGHT: 161 LBS | BODY MASS INDEX: 27.49 KG/M2
